# Patient Record
Sex: FEMALE | Race: WHITE | NOT HISPANIC OR LATINO | Employment: OTHER | URBAN - NONMETROPOLITAN AREA
[De-identification: names, ages, dates, MRNs, and addresses within clinical notes are randomized per-mention and may not be internally consistent; named-entity substitution may affect disease eponyms.]

---

## 2017-01-17 RX ORDER — CITALOPRAM 20 MG/1
TABLET ORAL
Qty: 28 TABLET | OUTPATIENT
Start: 2017-01-17

## 2017-01-17 NOTE — TELEPHONE ENCOUNTER
Patient states she is now seeing Dr. Harpreet Mcgarry,  [facility] needs to contact the patient for more information on the provider.

## 2017-10-19 ENCOUNTER — OFFICE VISIT (OUTPATIENT)
Dept: PODIATRY | Facility: CLINIC | Age: 72
End: 2017-10-19

## 2017-10-19 VITALS
WEIGHT: 208 LBS | BODY MASS INDEX: 29.12 KG/M2 | SYSTOLIC BLOOD PRESSURE: 105 MMHG | OXYGEN SATURATION: 95 % | HEART RATE: 78 BPM | DIASTOLIC BLOOD PRESSURE: 69 MMHG | HEIGHT: 71 IN

## 2017-10-19 DIAGNOSIS — B07.0 PLANTAR WARTS: Primary | ICD-10-CM

## 2017-10-19 DIAGNOSIS — M79.672 LEFT FOOT PAIN: ICD-10-CM

## 2017-10-19 PROCEDURE — 99203 OFFICE O/P NEW LOW 30 MIN: CPT | Performed by: PODIATRIST

## 2017-10-19 PROCEDURE — 11100 PR BIOPSY OF SKIN LESION: CPT | Performed by: PODIATRIST

## 2017-10-19 RX ORDER — PANTOPRAZOLE SODIUM 40 MG/1
40 TABLET, DELAYED RELEASE ORAL DAILY
COMMUNITY

## 2017-10-19 RX ORDER — CARVEDILOL 12.5 MG/1
12.5 TABLET ORAL 2 TIMES DAILY WITH MEALS
COMMUNITY

## 2017-10-19 RX ORDER — CELECOXIB 200 MG/1
200 CAPSULE ORAL DAILY
COMMUNITY

## 2017-10-19 RX ORDER — OXYCODONE AND ACETAMINOPHEN 10; 325 MG/1; MG/1
1 TABLET ORAL EVERY 6 HOURS PRN
COMMUNITY

## 2017-11-02 ENCOUNTER — OFFICE VISIT (OUTPATIENT)
Dept: PODIATRY | Facility: CLINIC | Age: 72
End: 2017-11-02

## 2017-11-02 VITALS — BODY MASS INDEX: 29.12 KG/M2 | WEIGHT: 208 LBS | HEIGHT: 71 IN

## 2017-11-02 DIAGNOSIS — M79.672 LEFT FOOT PAIN: ICD-10-CM

## 2017-11-02 DIAGNOSIS — B07.0 PLANTAR WARTS: Primary | ICD-10-CM

## 2017-11-02 PROCEDURE — 11100 PR BIOPSY OF SKIN LESION: CPT | Performed by: PODIATRIST

## 2017-11-02 NOTE — PROGRESS NOTES
Chanell Carrera  1945  72 y.o. female   Patient presents today for left foot biopsy results and suture removal.      Chief Complaint   Patient presents with   • Left Foot - Follow-up           History of Present Illness    Chanell Carrera is a 72 y.o. female            Past Medical History:   Diagnosis Date   • Plantar wart          Past Surgical History:   Procedure Laterality Date   • BACK SURGERY     •  SECTION     • HYSTERECTOMY     • NECK SURGERY     • VEIN SURGERY      removed between ovary and kidney         Family History   Problem Relation Age of Onset   • Cancer Mother    • Cancer Father    • Cancer Brother    • Cancer Maternal Grandmother    • Cancer Maternal Grandfather    • Cancer Paternal Grandmother    • Cancer Paternal Grandfather    • Cancer Sister          Social History     Social History   • Marital status:      Spouse name: N/A   • Number of children: N/A   • Years of education: N/A     Occupational History   • Not on file.     Social History Main Topics   • Smoking status: Never Smoker   • Smokeless tobacco: Never Used   • Alcohol use No   • Drug use: No   • Sexual activity: Not on file     Other Topics Concern   • Not on file     Social History Narrative         Current Outpatient Prescriptions   Medication Sig Dispense Refill   • carvedilol (COREG) 12.5 MG tablet Take 12.5 mg by mouth 2 (Two) Times a Day With Meals.     • celecoxib (CeleBREX) 200 MG capsule Take 200 mg by mouth Daily.     • citalopram (CeleXA) 20 MG tablet Take 1 tablet by mouth daily. 90 tablet 1   • Mirabegron ER (MYRBETRIQ) 50 MG tablet sustained-release 24 hour 24 hr tablet Take 50 mg by mouth Daily.     • oxyCODONE-acetaminophen (PERCOCET)  MG per tablet Take 1 tablet by mouth Every 6 (Six) Hours As Needed for Moderate Pain .     • pantoprazole (PROTONIX) 40 MG EC tablet Take 40 mg by mouth Daily.       No current facility-administered medications for this visit.   "        OBJECTIVE    Ht 71\" (180.3 cm)  Wt 208 lb (94.3 kg)  BMI 29.01 kg/m2      Review of Systems   ***      ***            ASSESSMENT AND PLAN    There are no diagnoses linked to this encounter.          This document has been electronically signed by Estephania Talbot MA on November 2, 2017 4:06 PM     EMR Dragon/Transcription disclaimer:   Much of this encounter note is an electronic transcription/translation of spoken language to printed text. The electronic translation of spoken language may permit erroneous, or at times, nonsensical words or phrases to be inadvertently transcribed; Although I have reviewed the note for such errors, some may still exist.    Estephania Talbot MA  11/2/2017  4:06 PM            "

## 2017-11-16 NOTE — PROGRESS NOTES
Chanell Carrera  1945  72 y.o. female   Patient presents today for plantar wart of the left foot.   2017    Chief Complaint   Patient presents with   • Left Foot - Follow-up           History of Present Illness    Chanell Carrera is a 72 y.o. female who presents For follow-up of left foot skin lesion biopsy results.      Past Medical History:   Diagnosis Date   • Plantar wart          Past Surgical History:   Procedure Laterality Date   • BACK SURGERY     •  SECTION     • HYSTERECTOMY     • NECK SURGERY     • VEIN SURGERY      removed between ovary and kidney         Family History   Problem Relation Age of Onset   • Cancer Mother    • Cancer Father    • Cancer Brother    • Cancer Maternal Grandmother    • Cancer Maternal Grandfather    • Cancer Paternal Grandmother    • Cancer Paternal Grandfather    • Cancer Sister          Social History     Social History   • Marital status:      Spouse name: N/A   • Number of children: N/A   • Years of education: N/A     Occupational History   • Not on file.     Social History Main Topics   • Smoking status: Never Smoker   • Smokeless tobacco: Never Used   • Alcohol use No   • Drug use: No   • Sexual activity: Not on file     Other Topics Concern   • Not on file     Social History Narrative         Current Outpatient Prescriptions   Medication Sig Dispense Refill   • carvedilol (COREG) 12.5 MG tablet Take 12.5 mg by mouth 2 (Two) Times a Day With Meals.     • celecoxib (CeleBREX) 200 MG capsule Take 200 mg by mouth Daily.     • citalopram (CeleXA) 20 MG tablet Take 1 tablet by mouth daily. 90 tablet 1   • Mirabegron ER (MYRBETRIQ) 50 MG tablet sustained-release 24 hour 24 hr tablet Take 50 mg by mouth Daily.     • oxyCODONE-acetaminophen (PERCOCET)  MG per tablet Take 1 tablet by mouth Every 6 (Six) Hours As Needed for Moderate Pain .     • pantoprazole (PROTONIX) 40 MG EC tablet Take 40 mg by mouth Daily.       No current  "facility-administered medications for this visit.          OBJECTIVE    Ht 71\" (180.3 cm)  Wt 208 lb (94.3 kg)  BMI 29.01 kg/m2      Review of Systems   Constitutional:  Denies recent weight loss, weight gain, fever or chills, no change in exercise tolerance  Musculoskeletal: Toe/foot pain.   Skin: Plantar warts  Neurological: Denies paresthesias.  Psychiatric/Behavioral: Denies depression        Physical Exam   Constitutional: she appears well-developed and well-nourished.   CV: No chest pain. Normal RR  Resp: Non labored respirations  Psychiatric: she has a normal mood and affect. her behavior is normal.      Lower Extremity Exam:  Vascular: DP/PT pulses palpable 2+.   No edema  Toes warm  Neuro: Protective sensation intact, b/l.  DTRs intact  Integument: No open wounds.  No erythema, scaling  8-10  plantar verruca to left foot and forefoot.  Largest measuring approximately 2 cm in diameter.  +pain on lateral squeeze test.  Musculoskeletal: LE muscle strength 5/5.   Gait normal  Ankle ROM full without pain or crepitus  STJ ROM full without pain or crepitus  No digital deformities      Left skin biopsy, autoinnoculation:  Risks, benefits discussed. Written consent obtained.  Local area anesthetized with 2 % lidocaine plain.  Site cleansed with ChloraPrep swab  Two 4-mm punch biopsies taken.  The biopsies were then inverted and reinserted into the biopsy site to serve as auto inoculation process.  Biopsy site reapproximated with 4-0 nylon  Her strips and dry dressing applied  Pt tolerated well          ASSESSMENT AND PLAN    Chanell was seen today for follow-up.    Diagnoses and all orders for this visit:    Plantar warts    Left foot pain      -Comprehensive foot and ankle exam performed  -Educated pt on diagnosis, etiology and treatment of plantar verruca.  -Patient has previously failed standard therapies for plantar warts.  Discussed treatment options including auto inoculation procedures.  Patient would like to " proceed and the procedure was performed as above  -Recheck 2 weeks.          This document has been electronically signed by Alvin Back DPM on November 15, 2017 8:27 PM     EMR Dragon/Transcription disclaimer:   Much of this encounter note is an electronic transcription/translation of spoken language to printed text. The electronic translation of spoken language may permit erroneous, or at times, nonsensical words or phrases to be inadvertently transcribed; Although I have reviewed the note for such errors, some may still exist.    Alvin Back DPM  11/15/2017  8:27 PM

## 2017-11-21 ENCOUNTER — OFFICE VISIT (OUTPATIENT)
Dept: PODIATRY | Facility: CLINIC | Age: 72
End: 2017-11-21

## 2017-11-21 VITALS — HEIGHT: 71 IN | WEIGHT: 208 LBS | BODY MASS INDEX: 29.12 KG/M2

## 2017-11-21 DIAGNOSIS — B07.0 PLANTAR WARTS: Primary | ICD-10-CM

## 2017-11-21 DIAGNOSIS — M79.672 LEFT FOOT PAIN: ICD-10-CM

## 2017-11-21 PROCEDURE — 99212 OFFICE O/P EST SF 10 MIN: CPT | Performed by: PODIATRIST

## 2017-12-12 ENCOUNTER — OFFICE VISIT (OUTPATIENT)
Dept: PODIATRY | Facility: CLINIC | Age: 72
End: 2017-12-12

## 2017-12-12 VITALS — HEIGHT: 71 IN | BODY MASS INDEX: 29.12 KG/M2 | WEIGHT: 208 LBS

## 2017-12-12 DIAGNOSIS — B07.0 PLANTAR WARTS: Primary | ICD-10-CM

## 2017-12-12 DIAGNOSIS — M79.672 LEFT FOOT PAIN: ICD-10-CM

## 2017-12-12 PROCEDURE — 99212 OFFICE O/P EST SF 10 MIN: CPT | Performed by: PODIATRIST

## 2017-12-12 NOTE — PROGRESS NOTES
Chanell Carrera  1945  72 y.o. female   Patient presents today for plantar wart of the left foot follow-up.   2017    Chief Complaint   Patient presents with   • Left Foot - Follow-up           History of Present Illness    Chanell Carrera is a 72 y.o. female who presents For follow-up of left foot plantar verruca autoinoculation procedure. Doing well.      Past Medical History:   Diagnosis Date   • Plantar wart          Past Surgical History:   Procedure Laterality Date   • BACK SURGERY     •  SECTION     • HYSTERECTOMY     • NECK SURGERY     • VEIN SURGERY      removed between ovary and kidney         Family History   Problem Relation Age of Onset   • Cancer Mother    • Cancer Father    • Cancer Brother    • Cancer Maternal Grandmother    • Cancer Maternal Grandfather    • Cancer Paternal Grandmother    • Cancer Paternal Grandfather    • Cancer Sister          Social History     Social History   • Marital status:      Spouse name: N/A   • Number of children: N/A   • Years of education: N/A     Occupational History   • Not on file.     Social History Main Topics   • Smoking status: Never Smoker   • Smokeless tobacco: Never Used   • Alcohol use No   • Drug use: No   • Sexual activity: Not on file     Other Topics Concern   • Not on file     Social History Narrative         Current Outpatient Prescriptions   Medication Sig Dispense Refill   • carvedilol (COREG) 12.5 MG tablet Take 12.5 mg by mouth 2 (Two) Times a Day With Meals.     • celecoxib (CeleBREX) 200 MG capsule Take 200 mg by mouth Daily.     • citalopram (CeleXA) 20 MG tablet Take 1 tablet by mouth daily. 90 tablet 1   • Mirabegron ER (MYRBETRIQ) 50 MG tablet sustained-release 24 hour 24 hr tablet Take 50 mg by mouth Daily.     • oxyCODONE-acetaminophen (PERCOCET)  MG per tablet Take 1 tablet by mouth Every 6 (Six) Hours As Needed for Moderate Pain .     • pantoprazole (PROTONIX) 40 MG EC tablet Take 40 mg by  "mouth Daily.       No current facility-administered medications for this visit.          OBJECTIVE    Ht 180.3 cm (71\")  Wt 94.3 kg (208 lb)  BMI 29.01 kg/m2      Review of Systems   Constitutional:  Denies recent weight loss, weight gain, fever or chills, no change in exercise tolerance  Musculoskeletal: Toe/foot pain.   Skin: Plantar warts  Neurological: Denies paresthesias.  Psychiatric/Behavioral: Denies depression        Physical Exam   Constitutional: she appears well-developed and well-nourished.   CV: No chest pain. Normal RR  Resp: Non labored respirations  Psychiatric: she has a normal mood and affect. her behavior is normal.      Lower Extremity Exam:  Vascular: DP/PT pulses palpable 2+.   No edema  Toes warm  Neuro: Protective sensation intact, b/l.  DTRs intact  Integument: No open wounds.  No erythema, scaling  8-10  plantar verruca to left foot and forefoot.  Largest measuring approximately 2 cm in diameter.  +pain on lateral squeeze test.  Biopsy/autoinoculation sites well healed  Musculoskeletal: LE muscle strength 5/5.   Gait normal  Ankle ROM full without pain or crepitus  STJ ROM full without pain or crepitus  No digital deformities              ASSESSMENT AND PLAN    Chanell was seen today for follow-up.    Diagnoses and all orders for this visit:    Plantar warts    Left foot pain    -Comprehensive foot and ankle exam performed  -Doing well.  Plantar verruca showing modest signs of improvement.  Educated patient that we do not expect dramatic improvement inside of 6 weeks.  -Recheck 3 weeks.          This document has been electronically signed by Alvin Back DPM on December 17, 2017 4:34 PM     EMR Dragon/Transcription disclaimer:   Much of this encounter note is an electronic transcription/translation of spoken language to printed text. The electronic translation of spoken language may permit erroneous, or at times, nonsensical words or phrases to be inadvertently transcribed; Although " I have reviewed the note for such errors, some may still exist.    Alvin Back DPM  12/17/2017  4:34 PM

## 2018-05-07 ENCOUNTER — OFFICE VISIT (OUTPATIENT)
Dept: PODIATRY | Facility: CLINIC | Age: 73
End: 2018-05-07

## 2018-05-07 VITALS — HEIGHT: 71 IN | BODY MASS INDEX: 30.27 KG/M2 | WEIGHT: 216.2 LBS

## 2018-05-07 DIAGNOSIS — M79.672 LEFT FOOT PAIN: ICD-10-CM

## 2018-05-07 DIAGNOSIS — B07.0 PLANTAR WARTS: Primary | ICD-10-CM

## 2018-05-07 PROCEDURE — 99214 OFFICE O/P EST MOD 30 MIN: CPT | Performed by: PODIATRIST

## 2018-05-07 NOTE — PROGRESS NOTES
Chanell Carrera  1945  73 y.o. female   Patient presents today for plantar wart of the left foot follow-up.   2018      Chief Complaint   Patient presents with   • Left Foot - Pain           History of Present Illness    Chanell Carrera is a 73 y.o. female who presents For follow-up of left foot plantar verruca. Previously underwent autoinoculation procedure without significant improvement.       Past Medical History:   Diagnosis Date   • Anxiety    • Arthritis    • C. difficile diarrhea     approx 5-6 yr ago   • Chronic back pain    • GERD (gastroesophageal reflux disease)    • Kidney atrophy     r/t pregnency    • Plantar wart    • Seizure     x 1, r/t medication   • Sleep apnea     does not wear c-pap         Past Surgical History:   Procedure Laterality Date   • BACK SURGERY      x 6   •  SECTION     • HYSTERECTOMY     • NECK SURGERY     • VEIN SURGERY      removed between ovary and kidney         Family History   Problem Relation Age of Onset   • Cancer Mother    • Cancer Father    • Cancer Brother    • Cancer Maternal Grandmother    • Cancer Maternal Grandfather    • Cancer Paternal Grandmother    • Cancer Paternal Grandfather    • Cancer Sister          Social History     Social History   • Marital status:      Spouse name: N/A   • Number of children: N/A   • Years of education: N/A     Occupational History   • Not on file.     Social History Main Topics   • Smoking status: Never Smoker   • Smokeless tobacco: Never Used   • Alcohol use Yes      Comment: occasional   • Drug use: No   • Sexual activity: Defer     Other Topics Concern   • Not on file     Social History Narrative   • No narrative on file         No current facility-administered medications for this visit.      No current outpatient prescriptions on file.     Facility-Administered Medications Ordered in Other Visits   Medication Dose Route Frequency Provider Last Rate Last Dose   • ceFAZolin (ANCEF) 2 g in  "sodium chloride 0.9 % 100 mL IVPB  2 g Intravenous Once Alvin Back DPM       • electrolyte-148 (PLASMALYTE) solution 1,000 mL  1,000 mL Intravenous Continuous Emil Smart MD 25 mL/hr at 05/16/18 0621 1,000 mL at 05/16/18 0621         OBJECTIVE    Ht 180.3 cm (71\")   Wt 98.1 kg (216 lb 3.2 oz)   BMI 30.15 kg/m²       Review of Systems   Constitutional:  Denies recent weight loss, weight gain, fever or chills, no change in exercise tolerance  Musculoskeletal: Toe/foot pain.   Skin: Plantar warts  Neurological: Denies paresthesias.  Psychiatric/Behavioral: Denies depression        Physical Exam   Constitutional: she appears well-developed and well-nourished.   CV: No chest pain. Normal RR  Resp: Non labored respirations  Psychiatric: she has a normal mood and affect. her behavior is normal.      Lower Extremity Exam:  Vascular: DP/PT pulses palpable 2+.   No edema  Toes warm  Neuro: Protective sensation intact, b/l.  DTRs intact  Integument: No open wounds.  No erythema, scaling  8-10  plantar verruca to left foot and forefoot.  Largest measuring approximately 2 cm in diameter.  +pain on lateral squeeze test.  Biopsy/autoinoculation sites well healed  Musculoskeletal: LE muscle strength 5/5.   Gait normal  Ankle ROM full without pain or crepitus  STJ ROM full without pain or crepitus  No digital deformities              ASSESSMENT AND PLAN    Chanell was seen today for pain.    Diagnoses and all orders for this visit:    Plantar warts  -     Case Request    Left foot pain      -Comprehensive foot and ankle exam performed  -Minimal improvement since last visit. At this point I did recommend formal excision and curettage in OR. Patient is agreeable. All risks, benefits and potential complications including but not limited to delayed healing, recurrence, risk of infection  Discussed.  -Will plan for 5/16/18          This document has been electronically signed by Alvin Back DPM on May 16, 2018 6:58 AM "     SEAN Dragon/Transcription disclaimer:   Much of this encounter note is an electronic transcription/translation of spoken language to printed text. The electronic translation of spoken language may permit erroneous, or at times, nonsensical words or phrases to be inadvertently transcribed; Although I have reviewed the note for such errors, some may still exist.    Alvin Back DPM  5/16/2018  6:58 AM

## 2018-05-11 ENCOUNTER — APPOINTMENT (OUTPATIENT)
Dept: PREADMISSION TESTING | Facility: HOSPITAL | Age: 73
End: 2018-05-11

## 2018-05-11 VITALS
SYSTOLIC BLOOD PRESSURE: 102 MMHG | RESPIRATION RATE: 14 BRPM | DIASTOLIC BLOOD PRESSURE: 60 MMHG | BODY MASS INDEX: 28.71 KG/M2 | HEIGHT: 72 IN | HEART RATE: 88 BPM | OXYGEN SATURATION: 95 % | WEIGHT: 212 LBS

## 2018-05-11 PROCEDURE — 93010 ELECTROCARDIOGRAM REPORT: CPT | Performed by: INTERNAL MEDICINE

## 2018-05-11 PROCEDURE — 93005 ELECTROCARDIOGRAM TRACING: CPT

## 2018-05-11 RX ORDER — SODIUM CHLORIDE, SODIUM GLUCONATE, SODIUM ACETATE, POTASSIUM CHLORIDE, AND MAGNESIUM CHLORIDE 526; 502; 368; 37; 30 MG/100ML; MG/100ML; MG/100ML; MG/100ML; MG/100ML
1000 INJECTION, SOLUTION INTRAVENOUS CONTINUOUS
Status: CANCELLED | OUTPATIENT
Start: 2018-05-16

## 2018-05-11 RX ORDER — METHOCARBAMOL 500 MG/1
500 TABLET, FILM COATED ORAL 2 TIMES DAILY
COMMUNITY
End: 2018-06-22 | Stop reason: SDUPTHER

## 2018-05-11 RX ORDER — NITROFURANTOIN 25; 75 MG/1; MG/1
100 CAPSULE ORAL EVERY 12 HOURS SCHEDULED
Status: ON HOLD | COMMUNITY
End: 2018-05-16

## 2018-05-11 RX ORDER — DIPHENHYDRAMINE HCL 25 MG
25 CAPSULE ORAL EVERY 6 HOURS PRN
COMMUNITY

## 2018-05-11 RX ORDER — ZOLPIDEM TARTRATE 10 MG/1
10 TABLET ORAL NIGHTLY PRN
COMMUNITY

## 2018-05-11 RX ORDER — TRAZODONE HYDROCHLORIDE 100 MG/1
100 TABLET ORAL NIGHTLY
COMMUNITY

## 2018-05-11 NOTE — DISCHARGE INSTRUCTIONS
[facility]  Pre-op Information and Guidelines    You will be called after 2 p.m. the day before your surgery (Friday for Monday surgery) and notified of your time for arrival and approximate surgery time.  If you have not received a call by 4P.M., please contact Same Day Surgery at [phone] of if outside [facility] call [phone].    Please Follow these Important Safety Guidelines:    • The morning of your procedure, take only the medications listed below with   A sip of water:___CARVEDILOL, CITALOPRAM, PANTOPRAZOLE,________       ___OXYCODONE_____________________________________    • DO NOT eat or drink anything after 12:00 midnight the night before surgery  Specific instructions concerning drinking clear liquids will be discussed during  the pre-surgery instruction call the day before your surgery.    • If you take a blood thinner (ex. Plavix, Coumadin, aspirin), ask your doctor when to stop it before surgery  STOP DATE: _________________    • Only 2 visitors are allowed in patient rooms at a time  Your visitors will be asked to wait in the lobby until the admission process is complete with the exception of a parent with a child and patients in need of special assistance.    • YOU CANNOT DRIVE YOURSELF HOME  You must be accompanied by someone who will be responsible for driving you home after surgery and for your care at home.    • DO NOT chew gum, use breath mints, hard candy, or smoke the day of surgery  • DO NOT drink alcohol for at least 24 hours before your surgery  • DO NOT wear any jewelry and remove all body piercing before coming to the hospital  • DO NOT wear make-up to the hospital  • If you are having surgery on an extremity (arm/leg/foot) remove nail polish/artificial nails on the surgical side  • Clothing, glasses, contacts, dentures, and hairpieces must be removed before surgery  • Bathe the night before or the morning of your surgery and do not use powders/lotions  on skin.

## 2018-05-16 ENCOUNTER — HOSPITAL ENCOUNTER (OUTPATIENT)
Facility: HOSPITAL | Age: 73
Setting detail: HOSPITAL OUTPATIENT SURGERY
Discharge: HOME | End: 2018-05-16
Attending: PODIATRIST | Admitting: PODIATRIST

## 2018-05-16 ENCOUNTER — ANESTHESIA (OUTPATIENT)
Dept: PERIOP | Facility: HOSPITAL | Age: 73
End: 2018-05-16

## 2018-05-16 ENCOUNTER — ANESTHESIA EVENT (OUTPATIENT)
Dept: PERIOP | Facility: HOSPITAL | Age: 73
End: 2018-05-16

## 2018-05-16 VITALS
WEIGHT: 206.35 LBS | HEART RATE: 80 BPM | DIASTOLIC BLOOD PRESSURE: 86 MMHG | RESPIRATION RATE: 20 BRPM | HEIGHT: 72 IN | BODY MASS INDEX: 27.95 KG/M2 | TEMPERATURE: 96.4 F | OXYGEN SATURATION: 93 % | SYSTOLIC BLOOD PRESSURE: 171 MMHG

## 2018-05-16 DIAGNOSIS — B07.0 PLANTAR WARTS: ICD-10-CM

## 2018-05-16 PROCEDURE — 25010000002 MIDAZOLAM PER 1 MG: Performed by: NURSE ANESTHETIST, CERTIFIED REGISTERED

## 2018-05-16 PROCEDURE — 25010000002 FENTANYL CITRATE (PF) 100 MCG/2ML SOLUTION: Performed by: NURSE ANESTHETIST, CERTIFIED REGISTERED

## 2018-05-16 PROCEDURE — 88305 TISSUE EXAM BY PATHOLOGIST: CPT | Performed by: PATHOLOGY

## 2018-05-16 PROCEDURE — 17111 DESTRUCTION B9 LESIONS 15/>: CPT | Performed by: PODIATRIST

## 2018-05-16 PROCEDURE — 25010000002 PROPOFOL 10 MG/ML EMULSION: Performed by: NURSE ANESTHETIST, CERTIFIED REGISTERED

## 2018-05-16 PROCEDURE — 25010000002 DEXAMETHASONE PER 1 MG: Performed by: NURSE ANESTHETIST, CERTIFIED REGISTERED

## 2018-05-16 PROCEDURE — 88305 TISSUE EXAM BY PATHOLOGIST: CPT | Performed by: PODIATRIST

## 2018-05-16 PROCEDURE — 25010000003 CEFAZOLIN PER 500 MG: Performed by: PODIATRIST

## 2018-05-16 PROCEDURE — 25010000002 ONDANSETRON PER 1 MG: Performed by: NURSE ANESTHETIST, CERTIFIED REGISTERED

## 2018-05-16 RX ORDER — FENTANYL CITRATE 50 UG/ML
INJECTION, SOLUTION INTRAMUSCULAR; INTRAVENOUS AS NEEDED
Status: DISCONTINUED | OUTPATIENT
Start: 2018-05-16 | End: 2018-05-16 | Stop reason: SURG

## 2018-05-16 RX ORDER — BUPIVACAINE HYDROCHLORIDE AND EPINEPHRINE 5; 5 MG/ML; UG/ML
INJECTION, SOLUTION EPIDURAL; INTRACAUDAL; PERINEURAL AS NEEDED
Status: DISCONTINUED | OUTPATIENT
Start: 2018-05-16 | End: 2018-05-16 | Stop reason: HOSPADM

## 2018-05-16 RX ORDER — FLUMAZENIL 0.1 MG/ML
0.2 INJECTION INTRAVENOUS AS NEEDED
Status: DISCONTINUED | OUTPATIENT
Start: 2018-05-16 | End: 2018-05-16 | Stop reason: HOSPADM

## 2018-05-16 RX ORDER — MEPERIDINE HYDROCHLORIDE 50 MG/ML
12.5 INJECTION INTRAMUSCULAR; INTRAVENOUS; SUBCUTANEOUS
Status: DISCONTINUED | OUTPATIENT
Start: 2018-05-16 | End: 2018-05-16 | Stop reason: HOSPADM

## 2018-05-16 RX ORDER — ONDANSETRON 2 MG/ML
INJECTION INTRAMUSCULAR; INTRAVENOUS AS NEEDED
Status: DISCONTINUED | OUTPATIENT
Start: 2018-05-16 | End: 2018-05-16 | Stop reason: SURG

## 2018-05-16 RX ORDER — EPHEDRINE SULFATE 50 MG/ML
5 INJECTION, SOLUTION INTRAVENOUS ONCE AS NEEDED
Status: DISCONTINUED | OUTPATIENT
Start: 2018-05-16 | End: 2018-05-16 | Stop reason: HOSPADM

## 2018-05-16 RX ORDER — SODIUM CHLORIDE, SODIUM GLUCONATE, SODIUM ACETATE, POTASSIUM CHLORIDE, AND MAGNESIUM CHLORIDE 526; 502; 368; 37; 30 MG/100ML; MG/100ML; MG/100ML; MG/100ML; MG/100ML
1000 INJECTION, SOLUTION INTRAVENOUS CONTINUOUS
Status: DISCONTINUED | OUTPATIENT
Start: 2018-05-16 | End: 2018-05-16 | Stop reason: HOSPADM

## 2018-05-16 RX ORDER — PROPOFOL 10 MG/ML
VIAL (ML) INTRAVENOUS AS NEEDED
Status: DISCONTINUED | OUTPATIENT
Start: 2018-05-16 | End: 2018-05-16 | Stop reason: SURG

## 2018-05-16 RX ORDER — DEXAMETHASONE SODIUM PHOSPHATE 4 MG/ML
INJECTION, SOLUTION INTRA-ARTICULAR; INTRALESIONAL; INTRAMUSCULAR; INTRAVENOUS; SOFT TISSUE AS NEEDED
Status: DISCONTINUED | OUTPATIENT
Start: 2018-05-16 | End: 2018-05-16 | Stop reason: SURG

## 2018-05-16 RX ORDER — NALOXONE HCL 0.4 MG/ML
0.2 VIAL (ML) INJECTION AS NEEDED
Status: DISCONTINUED | OUTPATIENT
Start: 2018-05-16 | End: 2018-05-16 | Stop reason: HOSPADM

## 2018-05-16 RX ORDER — LABETALOL HYDROCHLORIDE 5 MG/ML
5 INJECTION, SOLUTION INTRAVENOUS
Status: DISCONTINUED | OUTPATIENT
Start: 2018-05-16 | End: 2018-05-16 | Stop reason: HOSPADM

## 2018-05-16 RX ORDER — ACETAMINOPHEN 325 MG/1
650 TABLET ORAL ONCE AS NEEDED
Status: DISCONTINUED | OUTPATIENT
Start: 2018-05-16 | End: 2018-05-16 | Stop reason: HOSPADM

## 2018-05-16 RX ORDER — ACETAMINOPHEN 650 MG/1
650 SUPPOSITORY RECTAL ONCE AS NEEDED
Status: DISCONTINUED | OUTPATIENT
Start: 2018-05-16 | End: 2018-05-16 | Stop reason: HOSPADM

## 2018-05-16 RX ORDER — MIDAZOLAM HYDROCHLORIDE 1 MG/ML
INJECTION INTRAMUSCULAR; INTRAVENOUS AS NEEDED
Status: DISCONTINUED | OUTPATIENT
Start: 2018-05-16 | End: 2018-05-16 | Stop reason: SURG

## 2018-05-16 RX ORDER — LIDOCAINE HYDROCHLORIDE 20 MG/ML
INJECTION, SOLUTION INFILTRATION; PERINEURAL AS NEEDED
Status: DISCONTINUED | OUTPATIENT
Start: 2018-05-16 | End: 2018-05-16 | Stop reason: SURG

## 2018-05-16 RX ORDER — DIPHENHYDRAMINE HYDROCHLORIDE 50 MG/ML
12.5 INJECTION INTRAMUSCULAR; INTRAVENOUS
Status: DISCONTINUED | OUTPATIENT
Start: 2018-05-16 | End: 2018-05-16 | Stop reason: HOSPADM

## 2018-05-16 RX ORDER — ONDANSETRON 2 MG/ML
4 INJECTION INTRAMUSCULAR; INTRAVENOUS ONCE AS NEEDED
Status: DISCONTINUED | OUTPATIENT
Start: 2018-05-16 | End: 2018-05-16 | Stop reason: HOSPADM

## 2018-05-16 RX ADMIN — PROPOFOL 100 MG: 10 INJECTION, EMULSION INTRAVENOUS at 07:26

## 2018-05-16 RX ADMIN — SODIUM CHLORIDE, SODIUM GLUCONATE, SODIUM ACETATE, POTASSIUM CHLORIDE, AND MAGNESIUM CHLORIDE 1000 ML: 526; 502; 368; 37; 30 INJECTION, SOLUTION INTRAVENOUS at 06:21

## 2018-05-16 RX ADMIN — MIDAZOLAM 1 MG: 1 INJECTION INTRAMUSCULAR; INTRAVENOUS at 07:20

## 2018-05-16 RX ADMIN — CEFAZOLIN SODIUM 2 G: 1 INJECTION, POWDER, FOR SOLUTION INTRAMUSCULAR; INTRAVENOUS at 07:27

## 2018-05-16 RX ADMIN — DEXAMETHASONE SODIUM PHOSPHATE 4 MG: 4 INJECTION, SOLUTION INTRAMUSCULAR; INTRAVENOUS at 07:30

## 2018-05-16 RX ADMIN — ONDANSETRON 4 MG: 2 INJECTION INTRAMUSCULAR; INTRAVENOUS at 08:12

## 2018-05-16 RX ADMIN — FENTANYL CITRATE 50 MCG: 50 INJECTION, SOLUTION INTRAMUSCULAR; INTRAVENOUS at 07:20

## 2018-05-16 RX ADMIN — LIDOCAINE HYDROCHLORIDE 80 MG: 20 INJECTION, SOLUTION INFILTRATION; PERINEURAL at 07:26

## 2018-05-16 NOTE — ANESTHESIA PROCEDURE NOTES
Airway  Urgency: elective    Airway not difficult    General Information and Staff    Patient location during procedure: OR  CRNA: VARINDER CERVANTES    Indications and Patient Condition    Preoxygenated: yes  Mask difficulty assessment: 0 - not attempted    Final Airway Details  Final airway type: supraglottic airway      Successful airway: I-gel  Size 4    Number of attempts at approach: 1    Additional Comments  Dr. Moshe Burton placed LMA under direct supervision of CRNA; no comps noted, patient tolerated well, sats maintained > 95% throughout induction.

## 2018-05-16 NOTE — INTERVAL H&P NOTE
H&P reviewed. The patient was examined and there are no changes to the H&P.   Proceed as planned.          This document has been electronically signed by Alvin Back DPM on May 16, 2018 7:01 AM

## 2018-05-16 NOTE — OP NOTE
DATE OF PROCEDURE:  05/16/2018      PREOPERATIVE DIAGNOSES:  1. Plantar verruca, left foot      POSTOPERATIVE DIAGNOSES:  1. Plantar verruca, left foot      PROCEDURES:  1. Ablation and curettage of plantar verruca, left foot      ASSISTANT: Marely Caputo CST      ANESTHESIA: LMA General      HEMOSTASIS: None      ESTIMATED BLOOD LOSS: Less than 10 mL.      MATERIALS: None      INJECTABLES: 30 mL .0.5% Marcaine with epinephrine       COMPLICATIONS: None.      INDICATION: This is a prior patient seen in my clinic for treatment of plantar verruca. she failed prior conservative therapies.  The patient elected for the proposed surgical plan. All risks, benefits, potential complications were described in detail. No guarantees given or implied at anytime. He has been n.p.o. Since midnight. Informed consent has been obtained and located in the chart. Cephazolin 2 g was given as preoperative antibiotics in the preoperative holding area.      DESCRIPTION:    Under mild sedation patient brought in the operating room placed on operative table in supine position.  Following induction of general anesthesia the left foot and ankle were prepped and draped in usual aseptic fashion.  Attention was then drawn to the plantar left foot where there were approximately 17 discrete plantar verruca noted.  The largest measuring approximately 2.5 cm in diameter plantar lateral midfoot.  Each of these plantar verruca were anesthetized with the above-mentioned local anesthetic sublesionally.  An additional 8 cc of local anesthetic was utilized in a posterior tibial nerve block.  Each of the lesions were then sequentially ablated utilizing electrocautery, curettaged with a small curette and debrided with a 15 blade.  This process was repeated as needed until the lesions were ablated down to the level of the basement membrane.  Final electrocautery was utilized for hemostasis and the areas of excision were flushed with sterile saline.  A  dressing including Silvadene and dry gauze was then applied.  Patient will be able to ambulate in a surgical shoe and she'll follow up early next week for wound check.          This document has been electronically signed by Alvin Back DPM on May 16, 2018 8:41 AM   EMR Dragon/Transcription disclaimer:   Much of this encounter note is an electronic transcription/translation of spoken language to printed text. The electronic translation of spoken language may permit erroneous, or at times, nonsensical words or phrases to be inadvertently transcribed; Although I have reviewed the note for such errors, some may still exist.

## 2018-05-16 NOTE — BRIEF OP NOTE
EXCISION FOOT TUMOR  Progress Note    Chanell Rubi Adinolfi  5/16/2018    Pre-op Diagnosis:   Plantar warts [B07.0]       Post-Op Diagnosis Codes:     * Plantar warts [B07.0]    Procedure/CPT® Codes:      Procedure(s):  FOOT EXCISION AND ABLATION PLANTAR WARTS    Surgeon(s):  Alvin Back DPM    Anesthesia: Choice    Staff:   Circulator: Raman Mejia RN  Scrub Person: Roula Fontana  Assistant: Marely Caputo MA    Estimated Blood Loss: 20 mL    Urine Voided: * No values recorded between 5/16/2018  7:19 AM and 5/16/2018  8:32 AM *    Specimens:                ID Type Source Tests Collected by Time   A : plantar warts Tissue Foot, Left TISSUE PATHOLOGY EXAM Alvin Back DPM 5/16/2018 0754         Drains:      Findings: Consistent with diagnosis.    Complications: None      Alvin Back DPM     Date: 5/16/2018  Time: 8:33 AM

## 2018-05-16 NOTE — DISCHARGE INSTRUCTIONS
Care after Local Anesthesia    You have remained awake during your surgery. The medicine you received was injected directly into the surgery site. The medicine numbed the area so you didn't feel any pain during surgery. Depending on the medicine used, you may feel comfortable for several hours.    Activity:    1) Use caution to protect your extremity from injury until the numbness is gone. You may return to your normal home activities as directed by your health care provider.    2) You are at an increased risk for falling related to the anesthesia and/or sedation during surgery and pain medication you will take at home. You will need assistance with ambulation until the feelings in your extremity returns to normal. Utilize assist devices (example: crutches, walker, cane or a care provider/family member as needed).    3) You must not drive a car or operate equipment for at least 24 hours unless exempted by the attending physician. If you are dizzy for longer than 24 hours, notify your physician.    Medicine/Discomfort:    You can expect some discomfort when the local anesthetic wears off, if your health care provider ordered pain medicine, take it as prescribed.    Diet:    You may resume your normal diet. Do not drink alcoholic beverages for at least 24 hours following anesthesia and/or sedation.    When to call your health care provider:    Call your health care provider if you have any questions or concerns.  Leave dressing clean, dry and intact until your first postoperative visit.    You may heel weight bear as tolerated in your surgical boot only.    Take pain medications as prescribed.     Elevate surgical extremity above level of heart while at rest.     Contact doctor for increased pain, drainage, nausea, vomiting, fever or chills.

## 2018-05-16 NOTE — ANESTHESIA POSTPROCEDURE EVALUATION
Patient: Chanell Rubi Adinolfi    Procedure Summary     Date:  05/16/18 Room / Location:  [facility] OR 06 / [facility] OR    Anesthesia Start:  0720 Anesthesia Stop:  0834    Procedure:  FOOT EXCISION AND ABLATION PLANTAR WARTS (Left Foot) Diagnosis:       Plantar warts      (Plantar warts [B07.0])    Surgeon:  Alvin Back DPM Provider:  Eliot Montesinos MD    Anesthesia Type:  general ASA Status:  3          Anesthesia Type: general  Last vitals  BP   151/73 (05/16/18 0610)   Temp   97.6 °F (36.4 °C) (05/16/18 0610)   Pulse   87 (05/16/18 0610)   Resp   18 (05/16/18 0610)     SpO2   95 % (05/16/18 0610)     Post Anesthesia Care and Evaluation    Patient location during evaluation: PACU  Patient participation: complete - patient participated  Level of consciousness: awake and awake and alert  Pain management: adequate  Airway patency: patent  Anesthetic complications: No anesthetic complications    Cardiovascular status: acceptable  Respiratory status: acceptable  Hydration status: acceptable

## 2018-05-17 LAB
LAB AP CASE REPORT: NORMAL
Lab: NORMAL
PATH REPORT.FINAL DX SPEC: NORMAL
PATH REPORT.GROSS SPEC: NORMAL

## 2018-05-21 ENCOUNTER — OFFICE VISIT (OUTPATIENT)
Dept: PODIATRY | Facility: CLINIC | Age: 73
End: 2018-05-21

## 2018-05-21 VITALS — HEIGHT: 71 IN | WEIGHT: 208 LBS | BODY MASS INDEX: 29.12 KG/M2

## 2018-05-21 DIAGNOSIS — M79.672 LEFT FOOT PAIN: ICD-10-CM

## 2018-05-21 DIAGNOSIS — B07.0 PLANTAR WARTS: Primary | ICD-10-CM

## 2018-05-21 PROCEDURE — 99024 POSTOP FOLLOW-UP VISIT: CPT | Performed by: PODIATRIST

## 2018-05-21 NOTE — PROGRESS NOTES
Chanell Carrera  1945  73 y.o. female   Patient presents today for plantar wart of the left foot follow-up.   2018        Chief Complaint   Patient presents with   • Left Foot - Post-op           History of Present Illness    Chanell Carrera is a 73 y.o. female who presents For follow-up of left foot plantar verruca. Underwent ablation and curettage on 2018.  She is doing well overall.  Some pain to the surgical sites as noted.      Past Medical History:   Diagnosis Date   • Anxiety    • Arthritis    • C. difficile diarrhea     approx 5-6 yr ago   • Chronic back pain    • GERD (gastroesophageal reflux disease)    • Kidney atrophy     r/t pregnency    • Plantar wart    • Seizure     x 1, r/t medication   • Sleep apnea     does not wear c-pap         Past Surgical History:   Procedure Laterality Date   • BACK SURGERY      x 6   •  SECTION     • EXCISION FOOT TUMOR Left 2018    Procedure: FOOT EXCISION AND ABLATION PLANTAR WARTS;  Surgeon: Alvin Back DPM;  Location[facility];  Service: Podiatry   • HYSTERECTOMY     • NECK SURGERY     • VEIN SURGERY      removed between ovary and kidney         Family History   Problem Relation Age of Onset   • Cancer Mother    • Cancer Father    • Cancer Brother    • Cancer Maternal Grandmother    • Cancer Maternal Grandfather    • Cancer Paternal Grandmother    • Cancer Paternal Grandfather    • Cancer Sister          Social History     Social History   • Marital status:      Spouse name: N/A   • Number of children: N/A   • Years of education: N/A     Occupational History   • Not on file.     Social History Main Topics   • Smoking status: Never Smoker   • Smokeless tobacco: Never Used   • Alcohol use Yes      Comment: occasional   • Drug use: No   • Sexual activity: Defer     Other Topics Concern   • Not on file     Social History Narrative   • No narrative on file         Current Outpatient Prescriptions   Medication Sig  "Dispense Refill   • carvedilol (COREG) 12.5 MG tablet Take 12.5 mg by mouth 2 (Two) Times a Day With Meals.     • celecoxib (CeleBREX) 200 MG capsule Take 200 mg by mouth Daily.     • citalopram (CeleXA) 20 MG tablet Take 1 tablet by mouth daily. 90 tablet 1   • diphenhydrAMINE (BENADRYL) 25 mg capsule Take 25 mg by mouth Every 6 (Six) Hours As Needed for Itching.     • methocarbamol (ROBAXIN) 500 MG tablet Take 500 mg by mouth 2 (Two) Times a Day.     • Mirabegron ER (MYRBETRIQ) 50 MG tablet sustained-release 24 hour 24 hr tablet Take 50 mg by mouth Daily.     • oxyCODONE-acetaminophen (PERCOCET)  MG per tablet Take 1 tablet by mouth Every 6 (Six) Hours As Needed for Moderate Pain .     • pantoprazole (PROTONIX) 40 MG EC tablet Take 40 mg by mouth Daily.     • traZODone (DESYREL) 100 MG tablet Take 100 mg by mouth Every Night.     • zolpidem (AMBIEN) 10 MG tablet Take 10 mg by mouth At Night As Needed for Sleep.       No current facility-administered medications for this visit.          OBJECTIVE    Ht 180.3 cm (70.98\")   Wt 94.3 kg (208 lb)   BMI 29.02 kg/m²       Review of Systems   Constitutional:  Denies recent weight loss, weight gain, fever or chills, no change in exercise tolerance  Musculoskeletal: Toe/foot pain.   Skin: Plantar warts  Neurological: Denies paresthesias.  Psychiatric/Behavioral: Denies depression        Physical Exam   Constitutional: she appears well-developed and well-nourished.   CV: No chest pain. Normal RR  Resp: Non labored respirations  Psychiatric: she has a normal mood and affect. her behavior is normal.      Lower Extremity Exam:  Vascular: DP/PT pulses palpable 2+.   No edema  Toes warm  Neuro: Protective sensation intact, b/l.  DTRs intact  Integument: No open wounds.  No erythema, scaling  8-10  Or she'll thickness wounds to plantar left foot at site of prior verruca excision.  Wound bases are granular and there is no signs of infection.  Some tenderness to palpation is " noted.  Biopsy/autoinoculation sites well healed  Musculoskeletal: LE muscle strength 5/5.   Gait normal  Ankle ROM full without pain or crepitus  STJ ROM full without pain or crepitus  No digital deformities              ASSESSMENT AND PLAN    Chanell was seen today for post-op.    Diagnoses and all orders for this visit:    Plantar warts    Left foot pain      -Comprehensive foot and ankle exam performed  -Doing well overall.  Silvadene dressing change today.  -Continue surgical shoe for all ambulation.  -Recheck 1 week for dressing change, possible progression to home dressing changes at that time.          This document has been electronically signed by Alvin Back DPM on May 23, 2018 1:25 PM     EMR Dragon/Transcription disclaimer:   Much of this encounter note is an electronic transcription/translation of spoken language to printed text. The electronic translation of spoken language may permit erroneous, or at times, nonsensical words or phrases to be inadvertently transcribed; Although I have reviewed the note for such errors, some may still exist.    Alvin Back DPM  5/23/2018  1:25 PM

## 2018-05-30 ENCOUNTER — OFFICE VISIT (OUTPATIENT)
Dept: PODIATRY | Facility: CLINIC | Age: 73
End: 2018-05-30

## 2018-05-30 VITALS — BODY MASS INDEX: 29.1 KG/M2 | WEIGHT: 207.89 LBS | HEIGHT: 71 IN

## 2018-05-30 DIAGNOSIS — B07.0 PLANTAR WARTS: Primary | ICD-10-CM

## 2018-05-30 PROCEDURE — 99213 OFFICE O/P EST LOW 20 MIN: CPT | Performed by: PODIATRIST

## 2018-05-30 NOTE — PROGRESS NOTES
Chanell Carrera  1945  73 y.o. female   PCP- Dr. Mcgarry  Patient presents today for left foot post op.    2018    Chief Complaint   Patient presents with   • Left Foot - Follow-up       History of Present Illness    Chanell Carrera is a 73 y.o.female who presents to clinic for her second postoperative visit.  She had ablation of plantar warts left foot, date of surgery 2018.  Her dressing is clean, dry and intact and she is ambulating in a surgical shoe.  Currently rates her pain as a 4 out of 10.    Past Medical History:   Diagnosis Date   • Anxiety    • Arthritis    • C. difficile diarrhea     approx 5-6 yr ago   • Chronic back pain    • GERD (gastroesophageal reflux disease)    • Kidney atrophy     r/t pregnency    • Plantar wart    • Seizure     x 1, r/t medication   • Sleep apnea     does not wear c-pap         Past Surgical History:   Procedure Laterality Date   • BACK SURGERY      x 6   •  SECTION     • EXCISION FOOT TUMOR Left 2018    Procedure: FOOT EXCISION AND ABLATION PLANTAR WARTS;  Surgeon: Alvin Back DPM;  Location: [facility];  Service: Podiatry   • HYSTERECTOMY     • NECK SURGERY     • VEIN SURGERY      removed between ovary and kidney         Family History   Problem Relation Age of Onset   • Cancer Mother    • Cancer Father    • Cancer Brother    • Cancer Maternal Grandmother    • Cancer Maternal Grandfather    • Cancer Paternal Grandmother    • Cancer Paternal Grandfather    • Cancer Sister        No Known Allergies    Social History     Social History   • Marital status:      Spouse name: N/A   • Number of children: N/A   • Years of education: N/A     Occupational History   • Not on file.     Social History Main Topics   • Smoking status: Never Smoker   • Smokeless tobacco: Never Used   • Alcohol use Yes      Comment: occasional   • Drug use: No   • Sexual activity: Defer     Other Topics Concern   • Not on file     Social History Narrative  "  • No narrative on file         Current Outpatient Prescriptions   Medication Sig Dispense Refill   • carvedilol (COREG) 12.5 MG tablet Take 12.5 mg by mouth 2 (Two) Times a Day With Meals.     • celecoxib (CeleBREX) 200 MG capsule Take 200 mg by mouth Daily.     • citalopram (CeleXA) 20 MG tablet Take 1 tablet by mouth daily. 90 tablet 1   • diphenhydrAMINE (BENADRYL) 25 mg capsule Take 25 mg by mouth Every 6 (Six) Hours As Needed for Itching.     • methocarbamol (ROBAXIN) 500 MG tablet Take 500 mg by mouth 2 (Two) Times a Day.     • Mirabegron ER (MYRBETRIQ) 50 MG tablet sustained-release 24 hour 24 hr tablet Take 50 mg by mouth Daily.     • oxyCODONE-acetaminophen (PERCOCET)  MG per tablet Take 1 tablet by mouth Every 6 (Six) Hours As Needed for Moderate Pain .     • pantoprazole (PROTONIX) 40 MG EC tablet Take 40 mg by mouth Daily.     • traZODone (DESYREL) 100 MG tablet Take 100 mg by mouth Every Night.     • zolpidem (AMBIEN) 10 MG tablet Take 10 mg by mouth At Night As Needed for Sleep.       No current facility-administered medications for this visit.          OBJECTIVE    Ht 180.3 cm (70.98\")   Wt 94.3 kg (207 lb 14.3 oz)   BMI 29.01 kg/m²       Review of Systems   Constitutional: Negative for chills and fever.   Respiratory: Negative for chest tightness and shortness of breath.    Cardiovascular: Negative for chest pain and leg swelling.   Gastrointestinal: Negative for diarrhea, nausea and vomiting.   Musculoskeletal:        Left foot pain   Neurological: Negative.    Psychiatric/Behavioral: Negative.            Physical Exam   Constitutional: She is oriented to person, place, and time. She appears well-developed and well-nourished. No distress.   Eyes: EOM are normal. Pupils are equal, round, and reactive to light.   Pulmonary/Chest: Effort normal. No respiratory distress. She has no wheezes.   Neurological: She is alert and oriented to person, place, and time.   Psychiatric: She has a normal " mood and affect. Her behavior is normal.   Vitals reviewed.      Gait: normal    Assistive Device: none    Left Lower Extremity:     Vascular: DP/PT pulses palpable 2+.   No edema  Toes warm  Neuro: Protective sensation intact, b/l.  DTRs intact  Integument: No open wounds.  No erythema, scaling  8-10 partial thickness wounds to plantar left foot at site of prior verruca excision.  Wound bases are granular and there is no signs of infection.  Some tenderness to palpation is noted.  Biopsy/autoinoculation sites well healed  Musculoskeletal: LE muscle strength 5/5.   Gait normal  Ankle ROM full without pain or crepitus  STJ ROM full without pain or crepitus  No digital deformities         Procedures        ASSESSMENT AND PLAN    Chanell was seen today for follow-up.    Diagnoses and all orders for this visit:    Plantar warts      - Left foot dressed with Silvadene followed by dry sterile dressing.  Keep dressing clean, dry and intact  - Weightbearing as tolerated in offloading surgical shoe  - All her questions were answered  - Return to clinic in 1 week for follow-up with Dr. Back.          This document has been electronically signed by Loi Figueroa DPM on May 30, 2018 5:00 PM     5/30/2018  5:00 PM

## 2018-06-08 ENCOUNTER — OFFICE VISIT (OUTPATIENT)
Dept: PODIATRY | Facility: CLINIC | Age: 73
End: 2018-06-08

## 2018-06-08 VITALS — HEIGHT: 71 IN | BODY MASS INDEX: 29.1 KG/M2 | WEIGHT: 207.89 LBS

## 2018-06-08 DIAGNOSIS — B07.0 PLANTAR WARTS: Primary | ICD-10-CM

## 2018-06-08 PROCEDURE — 99212 OFFICE O/P EST SF 10 MIN: CPT | Performed by: PODIATRIST

## 2018-06-08 NOTE — PROGRESS NOTES
Chanell Carrera  1945  73 y.o. female   PCP- Dr. Mcgarry  Patient presents today for left foot post op.  2018        Chief Complaint   Patient presents with   • Left Foot - Post-op Follow-up       History of Present Illness    Chanell Carrera is a 73 y.o.female who presents to clinic for f/u ablation of plantar warts left foot, date of surgery 2018.  Her dressing is clean, dry and intact and she is ambulating in a surgical shoe.  Pain improving    Past Medical History:   Diagnosis Date   • Anxiety    • Arthritis    • C. difficile diarrhea     approx 5-6 yr ago   • Chronic back pain    • GERD (gastroesophageal reflux disease)    • Kidney atrophy     r/t pregnency    • Plantar wart    • Seizure     x 1, r/t medication   • Sleep apnea     does not wear c-pap         Past Surgical History:   Procedure Laterality Date   • BACK SURGERY      x 6   •  SECTION     • EXCISION FOOT TUMOR Left 2018    Procedure: FOOT EXCISION AND ABLATION PLANTAR WARTS;  Surgeon: Alvin Back DPM;  Location: [facility];  Service: Podiatry   • HYSTERECTOMY     • NECK SURGERY     • VEIN SURGERY      removed between ovary and kidney         Family History   Problem Relation Age of Onset   • Cancer Mother    • Cancer Father    • Cancer Brother    • Cancer Maternal Grandmother    • Cancer Maternal Grandfather    • Cancer Paternal Grandmother    • Cancer Paternal Grandfather    • Cancer Sister        No Known Allergies    Social History     Social History   • Marital status:      Spouse name: N/A   • Number of children: N/A   • Years of education: N/A     Occupational History   • Not on file.     Social History Main Topics   • Smoking status: Never Smoker   • Smokeless tobacco: Never Used   • Alcohol use Yes      Comment: occasional   • Drug use: No   • Sexual activity: Defer     Other Topics Concern   • Not on file     Social History Narrative   • No narrative on file         Current Outpatient  "Prescriptions   Medication Sig Dispense Refill   • carvedilol (COREG) 12.5 MG tablet Take 12.5 mg by mouth 2 (Two) Times a Day With Meals.     • celecoxib (CeleBREX) 200 MG capsule Take 200 mg by mouth Daily.     • citalopram (CeleXA) 20 MG tablet Take 1 tablet by mouth daily. 90 tablet 1   • diphenhydrAMINE (BENADRYL) 25 mg capsule Take 25 mg by mouth Every 6 (Six) Hours As Needed for Itching.     • methocarbamol (ROBAXIN) 500 MG tablet Take 500 mg by mouth 2 (Two) Times a Day.     • Mirabegron ER (MYRBETRIQ) 50 MG tablet sustained-release 24 hour 24 hr tablet Take 50 mg by mouth Daily.     • oxyCODONE-acetaminophen (PERCOCET)  MG per tablet Take 1 tablet by mouth Every 6 (Six) Hours As Needed for Moderate Pain .     • pantoprazole (PROTONIX) 40 MG EC tablet Take 40 mg by mouth Daily.     • traZODone (DESYREL) 100 MG tablet Take 100 mg by mouth Every Night.     • zolpidem (AMBIEN) 10 MG tablet Take 10 mg by mouth At Night As Needed for Sleep.       No current facility-administered medications for this visit.          OBJECTIVE    Ht 180.3 cm (70.98\")   Wt 94.3 kg (207 lb 14.3 oz)   BMI 29.01 kg/m²       Review of Systems   Constitutional: Negative for chills and fever.   Respiratory: Negative for chest tightness and shortness of breath.    Cardiovascular: Negative for chest pain and leg swelling.   Gastrointestinal: Negative for diarrhea, nausea and vomiting.   Musculoskeletal:        Left foot pain   Neurological: Negative.    Psychiatric/Behavioral: Negative.            Physical Exam   Constitutional: She is oriented to person, place, and time. She appears well-developed and well-nourished. No distress.   Eyes: EOM are normal. Pupils are equal, round, and reactive to light.   Pulmonary/Chest: Effort normal. No respiratory distress. She has no wheezes.   Neurological: She is alert and oriented to person, place, and time.   Psychiatric: She has a normal mood and affect. Her behavior is normal.   Vitals " reviewed.      Gait: normal    Assistive Device: none    Left Lower Extremity:     Vascular: DP/PT pulses palpable 2+.   No edema  Toes warm  Neuro: Protective sensation intact, b/l.  DTRs intact  Integument: No open wounds.  No erythema, scaling  8-10 partial thickness wounds to plantar left foot at site of prior verruca excision.  Wound bases are granular and there is no signs of infection.  Some tenderness to palpation is noted.  Musculoskeletal: LE muscle strength 5/5.   Gait normal  Ankle ROM full without pain or crepitus  STJ ROM full without pain or crepitus  No digital deformities         Procedures        ASSESSMENT AND PLAN    Chanell was seen today for post-op follow-up.    Diagnoses and all orders for this visit:    Plantar warts      -Doing well. Dressing change today  -Continue offloading shoe  - Return to clinic in 1 week          This document has been electronically signed by Alvin Back DPM on June 17, 2018 9:41 PM     6/17/2018  9:41 PM

## 2018-06-22 ENCOUNTER — OFFICE VISIT (OUTPATIENT)
Dept: PODIATRY | Facility: CLINIC | Age: 73
End: 2018-06-22

## 2018-06-22 DIAGNOSIS — M79.672 LEFT FOOT PAIN: ICD-10-CM

## 2018-06-22 DIAGNOSIS — L97.521 SKIN ULCER OF LEFT FOOT, LIMITED TO BREAKDOWN OF SKIN (HCC): Primary | ICD-10-CM

## 2018-06-22 DIAGNOSIS — B07.0 PLANTAR WARTS: ICD-10-CM

## 2018-06-22 PROCEDURE — 99213 OFFICE O/P EST LOW 20 MIN: CPT | Performed by: PODIATRIST

## 2018-06-22 RX ORDER — BACLOFEN 10 MG/1
10 TABLET ORAL 2 TIMES DAILY
COMMUNITY

## 2018-06-22 NOTE — PROGRESS NOTES
Chanell Carrera  1945  73 y.o. female   PCP- Dr. Harpreet Mcgarry      Patient presents for recheck foot excision and ablation plantar warts- LT foot. Date of surgery- 2018.          Chief Complaint   Patient presents with   • Left Foot - Follow-up       History of Present Illness    Chanell Carrera is a 73 y.o.female who presents to clinic for f/u ablation of plantar warts left foot, date of surgery 2018.  Her dressing is clean, dry and intact and she is ambulating in a surgical shoe.  Pain improving    Past Medical History:   Diagnosis Date   • Anxiety    • Arthritis    • C. difficile diarrhea     approx 5-6 yr ago   • Chronic back pain    • GERD (gastroesophageal reflux disease)    • Kidney atrophy     r/t pregnency    • Plantar wart    • Seizure     x 1, r/t medication   • Sleep apnea     does not wear c-pap         Past Surgical History:   Procedure Laterality Date   • BACK SURGERY      x 6   •  SECTION     • EXCISION FOOT TUMOR Left 2018    Procedure: FOOT EXCISION AND ABLATION PLANTAR WARTS;  Surgeon: Alvin Back DPM;  Location: [facility];  Service: Podiatry   • HYSTERECTOMY     • NECK SURGERY     • VEIN SURGERY      removed between ovary and kidney         Family History   Problem Relation Age of Onset   • Cancer Mother    • Cancer Father    • Cancer Brother    • Cancer Maternal Grandmother    • Cancer Maternal Grandfather    • Cancer Paternal Grandmother    • Cancer Paternal Grandfather    • Cancer Sister        No Known Allergies    Social History     Social History   • Marital status:      Spouse name: N/A   • Number of children: N/A   • Years of education: N/A     Occupational History   • Not on file.     Social History Main Topics   • Smoking status: Never Smoker   • Smokeless tobacco: Never Used   • Alcohol use Yes      Comment: occasional   • Drug use: No   • Sexual activity: Defer     Other Topics Concern   • Not on file     Social History  Narrative   • No narrative on file         Current Outpatient Prescriptions   Medication Sig Dispense Refill   • baclofen (LIORESAL) 10 MG tablet baclofen 10 mg tablet     • carvedilol (COREG) 12.5 MG tablet Take 12.5 mg by mouth 2 (Two) Times a Day With Meals.     • celecoxib (CeleBREX) 200 MG capsule Take 200 mg by mouth Daily.     • citalopram (CeleXA) 20 MG tablet Take 1 tablet by mouth daily. 90 tablet 1   • diphenhydrAMINE (BENADRYL) 25 mg capsule Take 25 mg by mouth Every 6 (Six) Hours As Needed for Itching.     • Mirabegron ER (MYRBETRIQ) 50 MG tablet sustained-release 24 hour 24 hr tablet Take 50 mg by mouth Daily.     • oxyCODONE-acetaminophen (PERCOCET)  MG per tablet Take 1 tablet by mouth Every 6 (Six) Hours As Needed for Moderate Pain .     • pantoprazole (PROTONIX) 40 MG EC tablet Take 40 mg by mouth Daily.     • traZODone (DESYREL) 100 MG tablet Take 100 mg by mouth Every Night.     • zolpidem (AMBIEN) 10 MG tablet Take 10 mg by mouth At Night As Needed for Sleep.       No current facility-administered medications for this visit.          OBJECTIVE    There were no vitals taken for this visit.      Review of Systems   Constitutional: Negative for chills and fever.   Respiratory: Negative for chest tightness and shortness of breath.    Cardiovascular: Negative for chest pain and leg swelling.   Gastrointestinal: Negative for diarrhea, nausea and vomiting.   Musculoskeletal:        Left foot pain   Neurological: Negative.    Psychiatric/Behavioral: Negative.            Physical Exam   Constitutional: She is oriented to person, place, and time. She appears well-developed and well-nourished. No distress.   Eyes: EOM are normal. Pupils are equal, round, and reactive to light.   Pulmonary/Chest: Effort normal. No respiratory distress. She has no wheezes.   Neurological: She is alert and oriented to person, place, and time.   Psychiatric: She has a normal mood and affect. Her behavior is normal.    Vitals reviewed.      Gait: normal    Assistive Device: none    Left Lower Extremity:     Vascular: DP/PT pulses palpable 2+.   No edema  Toes warm  Neuro: Protective sensation intact, b/l.  DTRs intact  Integument: No open wounds.  No erythema, scaling  3 x 1.5 cm partial thickness ulcer to plantar lateral midfoot at prior ablation site.  Wound bases are granular and there is no signs of infection.  Some tenderness to palpation is noted.  Musculoskeletal: LE muscle strength 5/5.   Gait normal  Ankle ROM full without pain or crepitus  STJ ROM full without pain or crepitus  No digital deformities         Procedures        ASSESSMENT AND PLAN    Chanell was seen today for follow-up.    Diagnoses and all orders for this visit:    Skin ulcer of left foot, limited to breakdown of skin    Plantar warts    Left foot pain      -Doing well. Dressing change today  -Continue offloading shoe  - Return to clinic in 2 week          This document has been electronically signed by Alvin Back DPM on June 27, 2018 8:48 PM     6/27/2018  8:48 PM

## 2018-06-24 NOTE — ANESTHESIA PREPROCEDURE EVALUATION
Anesthesia Evaluation     no history of anesthetic complications:  NPO Solid Status: > 8 hours  NPO Liquid Status: > 2 hours           Airway   Mallampati: II  TM distance: >3 FB  Neck ROM: full  Possible difficult intubation  Dental    (+) edentulous    Pulmonary     breath sounds clear to auscultation  (+) sleep apnea, decreased breath sounds,   Cardiovascular - normal exam  Exercise tolerance: poor (<4 METS)    ECG reviewed  Patient on routine beta blocker and Beta blocker given within 24 hours of surgery  Rhythm: regular  Rate: normal    (+) hypertension less than 2 medications,   (-) dysrhythmias    ROS comment: Normal sinus rhythm  Normal ECG  When compared with ECG of 29-JAN-2013 01:55,  No significant change was found  Confirmed by AKRAM    Neuro/Psych  (+) seizures (once in 2010) well controlled, headaches (migraines controlled), psychiatric history Anxiety and Depression,     GI/Hepatic/Renal/Endo    (+)  GERD well controlled,  renal disease CRI,     ROS Comment: Recurrent UTIs  incontinence    Musculoskeletal     (+) back pain, chronic pain,   Abdominal  - normal exam   Substance History - negative use     OB/GYN          Other   (+) arthritis (hips and hands)                     Anesthesia Plan    ASA 3     general     intravenous induction   Anesthetic plan and risks discussed with patient and spouse/significant other.      
Home

## 2018-07-06 ENCOUNTER — OFFICE VISIT (OUTPATIENT)
Dept: PODIATRY | Facility: CLINIC | Age: 73
End: 2018-07-06

## 2018-07-06 VITALS — OXYGEN SATURATION: 92 % | HEIGHT: 71 IN | BODY MASS INDEX: 28.98 KG/M2 | WEIGHT: 207 LBS | HEART RATE: 85 BPM

## 2018-07-06 DIAGNOSIS — L97.521 SKIN ULCER OF LEFT FOOT, LIMITED TO BREAKDOWN OF SKIN (HCC): ICD-10-CM

## 2018-07-06 DIAGNOSIS — B07.0 PLANTAR WARTS: Primary | ICD-10-CM

## 2018-07-06 PROCEDURE — 99212 OFFICE O/P EST SF 10 MIN: CPT | Performed by: PODIATRIST

## 2018-07-06 NOTE — PROGRESS NOTES
Chanell Carrera  1945  73 y.o. female     Patient presents for post op recheck of her left foot.    Chief Complaint   Patient presents with   • Left Foot - post op recheck       History of Present Illness    Chanell Carrera is a 73 y.o.female who presents to clinic for f/u ablation of plantar warts left foot, date of surgery 2018.  Her pain has significantly improved.  She has converted to just placing a small Band-Aid over the small residual wound on the bottom of her foot.  She has returned to regular shoes without issue.    Past Medical History:   Diagnosis Date   • Anxiety    • Arthritis    • C. difficile diarrhea     approx 5-6 yr ago   • Chronic back pain    • GERD (gastroesophageal reflux disease)    • Kidney atrophy     r/t pregnency    • Plantar wart    • Seizure (CMS/HCC)     x 1, r/t medication   • Sleep apnea     does not wear c-pap         Past Surgical History:   Procedure Laterality Date   • BACK SURGERY      x 6   •  SECTION     • EXCISION FOOT TUMOR Left 2018    Procedure: FOOT EXCISION AND ABLATION PLANTAR WARTS;  Surgeon: Alvin Back DPM;  Location: [facility];  Service: Podiatry   • HYSTERECTOMY     • NECK SURGERY     • VEIN SURGERY      removed between ovary and kidney         Family History   Problem Relation Age of Onset   • Cancer Mother    • Cancer Father    • Cancer Brother    • Cancer Maternal Grandmother    • Cancer Maternal Grandfather    • Cancer Paternal Grandmother    • Cancer Paternal Grandfather    • Cancer Sister        No Known Allergies    Social History     Social History   • Marital status:      Spouse name: N/A   • Number of children: N/A   • Years of education: N/A     Occupational History   • Not on file.     Social History Main Topics   • Smoking status: Never Smoker   • Smokeless tobacco: Never Used   • Alcohol use Yes      Comment: occasional   • Drug use: No   • Sexual activity: Defer     Other Topics Concern   • Not on  "file     Social History Narrative   • No narrative on file         Current Outpatient Prescriptions   Medication Sig Dispense Refill   • baclofen (LIORESAL) 10 MG tablet baclofen 10 mg tablet     • carvedilol (COREG) 12.5 MG tablet Take 12.5 mg by mouth 2 (Two) Times a Day With Meals.     • celecoxib (CeleBREX) 200 MG capsule Take 200 mg by mouth Daily.     • citalopram (CeleXA) 20 MG tablet Take 1 tablet by mouth daily. 90 tablet 1   • diphenhydrAMINE (BENADRYL) 25 mg capsule Take 25 mg by mouth Every 6 (Six) Hours As Needed for Itching.     • Mirabegron ER (MYRBETRIQ) 50 MG tablet sustained-release 24 hour 24 hr tablet Take 50 mg by mouth Daily.     • oxyCODONE-acetaminophen (PERCOCET)  MG per tablet Take 1 tablet by mouth Every 6 (Six) Hours As Needed for Moderate Pain .     • pantoprazole (PROTONIX) 40 MG EC tablet Take 40 mg by mouth Daily.     • traZODone (DESYREL) 100 MG tablet Take 100 mg by mouth Every Night.     • zolpidem (AMBIEN) 10 MG tablet Take 10 mg by mouth At Night As Needed for Sleep.       No current facility-administered medications for this visit.          OBJECTIVE    Pulse 85   Ht 180.3 cm (71\")   Wt 93.9 kg (207 lb)   SpO2 92%   BMI 28.87 kg/m²       Review of Systems   Constitutional: Negative.    HENT: Negative.    Eyes: Negative.    Respiratory: Negative.    Cardiovascular: Negative.    Gastrointestinal: Negative.    Endocrine: Negative.    Genitourinary: Negative.    Musculoskeletal: Negative.         Left foot pain   Skin: Negative.    Neurological: Negative.    Psychiatric/Behavioral: Negative.            Physical Exam   Constitutional: She is oriented to person, place, and time. She appears well-developed and well-nourished. No distress.   Eyes: EOM are normal. Pupils are equal, round, and reactive to light.   Pulmonary/Chest: Effort normal. No respiratory distress. She has no wheezes.   Neurological: She is alert and oriented to person, place, and time.   Psychiatric: She " has a normal mood and affect. Her behavior is normal.   Vitals reviewed.      Gait: normal    Assistive Device: none    Left Lower Extremity:     Vascular: DP/PT pulses palpable 2+.   No edema  Toes warm  Neuro: Protective sensation intact, b/l.  DTRs intact  Integument: No open wounds.  No erythema, scaling  1 x 0.2 cm partial thickness ulcer to plantar lateral midfoot at prior ablation site.  Wound bases are granular and there is no signs of infection.  Minimal tenderness to palpation is noted.  Musculoskeletal: LE muscle strength 5/5.   Gait normal  Ankle ROM full without pain or crepitus  STJ ROM full without pain or crepitus  No digital deformities         Procedures        ASSESSMENT AND PLAN    Chanell was seen today for post op recheck.    Diagnoses and all orders for this visit:    Plantar warts    Skin ulcer of left foot, limited to breakdown of skin (CMS/HCC)      -Doing well.  -Continue regular shoes, activity as tolerated  -Recheck as needed          This document has been electronically signed by Alvin Back DPM on July 11, 2018 5:04 PM     7/11/2018  5:04 PM

## 2018-12-12 ENCOUNTER — TRANSCRIBE ORDERS (OUTPATIENT)
Dept: PHYSICAL THERAPY | Facility: HOSPITAL | Age: 73
End: 2018-12-12

## 2018-12-12 DIAGNOSIS — M76.31 IT BAND SYNDROME, RIGHT: Primary | ICD-10-CM

## 2018-12-17 ENCOUNTER — APPOINTMENT (OUTPATIENT)
Dept: PHYSICAL THERAPY | Facility: HOSPITAL | Age: 73
End: 2018-12-17

## 2018-12-18 ENCOUNTER — HOSPITAL ENCOUNTER (OUTPATIENT)
Dept: PHYSICAL THERAPY | Facility: HOSPITAL | Age: 73
Setting detail: THERAPIES SERIES
Discharge: HOME | End: 2018-12-18

## 2018-12-18 DIAGNOSIS — M76.31 IT BAND SYNDROME, RIGHT: Primary | ICD-10-CM

## 2018-12-18 PROCEDURE — G8978 MOBILITY CURRENT STATUS: HCPCS | Performed by: PHYSICAL THERAPIST

## 2018-12-18 PROCEDURE — G8979 MOBILITY GOAL STATUS: HCPCS | Performed by: PHYSICAL THERAPIST

## 2018-12-18 PROCEDURE — 97162 PT EVAL MOD COMPLEX 30 MIN: CPT | Performed by: PHYSICAL THERAPIST

## 2018-12-18 NOTE — THERAPY EVALUATION
Outpatient Physical Therapy Ortho Initial Evaluation  [facility]     Patient Name: Chanell Rubi Adinolfi  : 1945  MRN: [id number]  Today's Date: 2018      Visit Date: 2018  Visit   Return to MD: RIGO  Re-cert date: 19  Patient Active Problem List   Diagnosis   • Plantar warts        Past Medical History:   Diagnosis Date   • Anxiety    • Arthritis    • C. difficile diarrhea     approx 5-6 yr ago   • Chronic back pain    • Fibromyalgia    • GERD (gastroesophageal reflux disease)    • Kidney atrophy     r/t pregnency    • Plantar wart    • Seizure (CMS/HCC)     x 1, r/t medication   • Sleep apnea     does not wear c-pap        Past Surgical History:   Procedure Laterality Date   • BACK SURGERY      x 6   •  SECTION     • HYSTERECTOMY     • NECK SURGERY     • VEIN SURGERY      removed between ovary and kidney       Visit Dx:     ICD-10-CM ICD-9-CM   1. It band syndrome, right M76.31 728.89          Medications (Admitted on 2018)     baclofen (LIORESAL) 10 MG tablet     carvedilol (COREG) 12.5 MG tablet     celecoxib (CeleBREX) 200 MG capsule     citalopram (CeleXA) 20 MG tablet     diphenhydrAMINE (BENADRYL) 25 mg capsule     Mirabegron ER (MYRBETRIQ) 50 MG tablet sustained-release 24 hour 24 hr tablet     oxyCODONE-acetaminophen (PERCOCET)  MG per tablet     pantoprazole (PROTONIX) 40 MG EC tablet     traZODone (DESYREL) 100 MG tablet     zolpidem (AMBIEN) 10 MG tablet      Allergies: NKA    PT Ortho     Row Name 18 1400       Subjective Comments    Subjective Comments  72 yo female with intermittent right LE pain and right sided back pain afte a fall 6 weeks ago. Reports R IT band region pain as well as intermittent R foot pain. Distant MRI (in ) showing evidence of severe lumbar DDD and h/o T12 vertebroplasty.  H/o multiple falls.   -BS       Precautions and Contraindications    Precautions/Limitations  fall precautions  -BS        Subjective Pain    Able to rate subjective pain?  yes  -BS    Pre-Treatment Pain Level  0  -BS    Post-Treatment Pain Level  6  -BS    Subjective Pain Comment  intermittent 8/10 R hip and proximal lateral thigh region pain-at worst  -BS       Posture/Observations    Posture/Observations Comments  antalgic gait pattern with R LE with use of tripod cane  -BS       Special Tests/Palpation    Special Tests/Palpation  Hip  -BS       Hip Special Tests    BLADIMIR (hip vs SI pathology)  Bilateral:;Positive  -BS    Hilary’s test (tightness of ITB)  Right:;Positive  -BS    FAIR test (piriformis syndrome)  Right:;Negative  -BS       General ROM    GENERAL ROM COMMENTS  AROM: R hip flex 91°, R knee 0-125° L hip flex 96° L knee 0-126°   -BS       MMT (Manual Muscle Testing)    General MMT Comments  MMT: R LE-hip flex 4-/5 hip abd 4/5 knee ext 3+/5 knee flex 5/5 ankle DF 4/5 L LE-hip flex 4+/5 hip abd knee ext 4/5 knee flex 5/5 ankle DF 5/5  -BS       Sensation    Light Touch  Partial deficits in the RLE  -BS      User Key  (r) = Recorded By, (t) = Taken By, (c) = Cosigned By    Initials Name Provider Type    Demario Zapata, PT Physical Therapist                      Therapy Education  Education Details: standing IT band S, S/L R SKC stretch  Given: HEP  Program: New  How Provided: Verbal, Demonstration, Written  Provided to: Patient, Other (comment)(pt's  present)  Level of Understanding: Verbalized, Demonstrated     PT OP Goals     Row Name 12/18/18 1400          PT Short Term Goals    STG Date to Achieve  01/01/19  -BS     STG 1  Pt independent with HEP for LE stretching/strengthening  -BS     STG 1 Progress  New  -BS     STG 2  Improve right hip flexion AROM to 95°.  -BS     STG 2 Progress  New  -BS     STG 3  Improve R LE MMT (hip flex/abd, knee ext) to 4+/5   -BS     STG 3 Progress  New  -BS     STG 4  Reduce right sided back and R hip pain by 25% with standing and walking  -BS     STG 4 Progress  New  -BS        Long  Term Goals    LTG Date to Achieve  01/15/19  -BS     LTG 1  Improve right hip flexion AROM to 100°.  -BS     LTG 1 Progress  New  -BS     LTG 2  Improve R LE MMT (hip flex/abd, knee ext) to 4+/5   -BS     LTG 2 Progress  New  -BS     LTG 3  Reduce right sided back and R hip pain by 50% with standing and walking  -BS     LTG 3 Progress  New  -BS     LTG 4  Improve LEFS score to 19 or higher  -BS     LTG 4 Progress  New  -BS        Time Calculation    PT Goal Re-Cert Due Date  01/08/19  -BS       User Key  (r) = Recorded By, (t) = Taken By, (c) = Cosigned By    Initials Name Provider Type    BS Demario Lopez, PT Physical Therapist          PT Assessment/Plan     Row Name 12/18/18 1600          PT Assessment    Functional Limitations  Impaired gait;Performance in work activities;Performance in sport activities;Performance in self-care ADL;Performance in leisure activities;Limitation in home management  -BS     Impairments  Sensation;Range of motion;Posture;Pain;Muscle strength;Gait  -BS     Assessment Comments  right sided hip/proximal lateral R thigh pain due to IT band syndrome.  -BS     Please refer to paper survey for additional self-reported information  Yes  -BS     Rehab Potential  Fair  -BS     Patient/caregiver participated in establishment of treatment plan and goals  Yes  -BS     Patient would benefit from skilled therapy intervention  Yes  -BS        PT Plan    PT Frequency  1x/week $40 co-pay  -BS     Predicted Duration of Therapy Intervention (Therapy Eval)  4-6 weeks  -BS     Planned CPT's?  PT EVAL MOD COMPLELITY: 96087;PT RE-EVAL: 56187;PT THER PROC EA 15 MIN: 55168;PT THER ACT EA 15 MIN: 61773;PT MANUAL THERAPY EA 15 MIN: 72740;PT NEUROMUSC RE-EDUCATION EA 15 MIN: 08595;PT HOT OR COLD PACK TREAT MCARE;PT ELECTRICAL STIM UNATTEND: ;PT ULTRASOUND EA 15 MIN: 53654;PT THER SUPP EA 15 MIN  -BS     Physical Therapy Interventions (Optional Details)  aquatics exercise;balance training;home exercise  "program;lumbar stabilization;manual therapy techniques;modalities;neuromuscular re-education;patient/family education;ROM (Range of Motion);stair training;strengthening;stretching;transfer training  -BS     PT Plan Comments  f/u with ITB and R piriformis stretching, clam shell ex. Avoid right sidelying position.   -BS       User Key  (r) = Recorded By, (t) = Taken By, (c) = Cosigned By    Initials Name Provider Type    Demario Zapata, PT Physical Therapist            Exercises     Row Name 12/18/18 1400             Subjective Comments    Subjective Comments  74 yo female with intermittent right LE pain and right sided back pain afte a fall 6 weeks ago. Reports R IT band region pain as well as intermittent R foot pain. Distant MRI (in 2013) showing evidence of severe lumbar DDD and h/o T12 vertebroplasty.  H/o multiple falls.   -BS         Subjective Pain    Able to rate subjective pain?  yes  -BS      Pre-Treatment Pain Level  0  -BS      Post-Treatment Pain Level  6  -BS      Subjective Pain Comment  intermittent 8/10 R hip and proximal lateral thigh region pain-at worst  -BS         Exercise 1    Exercise Name 1  S/L quad stretch  -BS      Reps 1  demonstration only  -BS         Exercise 2    Exercise Name 2  S/L right SKC stretch  -BS      Sets 2  1  -BS      Reps 2  5  -BS      Time 2  increased R buttocks/R hip pain  -BS         Exercise 3    Exercise Name 3  standing R ITB S at wall  -BS      Sets 3  1  -BS      Reps 3  2  -BS      Time 3  30\" hold  -BS        User Key  (r) = Recorded By, (t) = Taken By, (c) = Cosigned By    Initials Name Provider Type    Demario Zapata, PT Physical Therapist                        Outcome Measure Options: Lower Extremity Functional Scale (LEFS)  Lower Extremity Functional Index  Any of your usual work, housework or school activities: Quite a bit of difficulty  Your usual hobbies, recreational or sporting activities: Quite a bit of difficulty  Getting into or out of the " bath: Extreme difficulty or unable to perform activity  Walking between rooms: Quite a bit of difficulty  Putting on your shoes or socks: Quite a bit of difficulty  Squatting: Extreme difficulty or unable to perform activity  Lifting an object, like a bag of groceries from the floor: Quite a bit of difficulty  Performing light activities around your home: Quite a bit of difficulty  Performing heavy activities around your home: Extreme difficulty or unable to perform activity  Getting into or out of a car: Quite a bit of difficulty  Walking 2 blocks: Extreme difficulty or unable to perform activity  Walking a mile: Extreme difficulty or unable to perform activity  Going up or down 10 stairs (about 1 flight of stairs): Extreme difficulty or unable to perform activity  Standing for 1 hour: Quite a bit of difficulty  Sitting for 1 hour: Quite a bit of difficulty  Running on even ground: Extreme difficulty or unable to perform activity  Running on uneven ground: Extreme difficulty or unable to perform activity  Making sharp turns while running fast: Extreme difficulty or unable to perform activity  Hopping: Extreme difficulty or unable to perform activity  Rolling over in bed: Quite a bit of difficulty  Total: 10      Time Calculation:     Therapy Suggested Charges     Code   Minutes Charges    None             Start Time: 1400  Stop Time: 1438  Time Calculation (min): 38 min  Total Timed Code Minutes- PT: 38 minute(s)     Therapy Charges for Today     Code Description Service Date Service Provider Modifiers Qty    82270861710 HC PT MOBILITY CURRENT 12/18/2018 Demario Lopez, PT GP, CM 1    52177551620 HC PT MOBILITY PROJECTED 12/18/2018 Demario Lopez, PT GP, CL 1    18333233107 HC PT EVAL MOD COMPLEXITY 3 12/18/2018 Demario Lopez, PT GP 1          PT G-Codes  Outcome Measure Options: Lower Extremity Functional Scale (LEFS)  Total: 10  Functional Limitation: Mobility: Walking and moving around  Mobility: Walking and  Moving Around Current Status (): At least 80 percent but less than 100 percent impaired, limited or restricted  Mobility: Walking and Moving Around Goal Status (): At least 60 percent but less than 80 percent impaired, limited or restricted         Demario Lopez, PT  12/18/2018

## 2018-12-19 ENCOUNTER — APPOINTMENT (OUTPATIENT)
Dept: PHYSICAL THERAPY | Facility: HOSPITAL | Age: 73
End: 2018-12-19

## 2019-01-02 ENCOUNTER — HOSPITAL ENCOUNTER (OUTPATIENT)
Dept: PHYSICAL THERAPY | Facility: HOSPITAL | Age: 74
Setting detail: THERAPIES SERIES
Discharge: HOME | End: 2019-01-02

## 2019-01-02 DIAGNOSIS — M76.31 IT BAND SYNDROME, RIGHT: Primary | ICD-10-CM

## 2019-01-02 PROCEDURE — 97110 THERAPEUTIC EXERCISES: CPT

## 2019-01-02 NOTE — THERAPY TREATMENT NOTE
Outpatient Physical Therapy Ortho Treatment Note  [facility]     Patient Name: Chanell Rubi Adinolfi  : 1945  MRN: [id number]  Today's Date: 2019      Visit Date: 2019  Pt reports 6/10 pain pre treatment,6 /10 pain post treatment  Reports 0% of improvement.  Attended 2/2 visits.  Insurance available: Medicare guidelines/40$  Next MD appt: TBBRODY .  Recertification: 2019.  Visit Dx:    ICD-10-CM ICD-9-CM   1. It band syndrome, right M76.31 728.89       Patient Active Problem List   Diagnosis   • Plantar warts        Past Medical History:   Diagnosis Date   • Anxiety    • Arthritis    • C. difficile diarrhea     approx 5-6 yr ago   • Chronic back pain    • Fibromyalgia    • GERD (gastroesophageal reflux disease)    • Kidney atrophy     r/t pregnency    • Plantar wart    • Seizure (CMS/HCC)     x 1, r/t medication   • Sleep apnea     does not wear c-pap        Past Surgical History:   Procedure Laterality Date   • BACK SURGERY      x 6   •  SECTION     • EXCISION FOOT TUMOR Left 2018    Procedure: FOOT EXCISION AND ABLATION PLANTAR WARTS;  Surgeon: Alvin Back DPM;  Location: [facility];  Service: Podiatry   • HYSTERECTOMY     • NECK SURGERY     • VEIN SURGERY      removed between ovary and kidney                       PT Assessment/Plan     Row Name 19 1600          PT Assessment    Assessment Comments  No new goals met today. Pt tolerated treatment of pro ll this date. Pt only able to perform 7 reps of clams. pt not a candiate for aquatics.  -TL        PT Plan    PT Frequency  1x/week 40.00 co pay  -TL     PT Plan Comments  add supine heelslides and hip abd in standing  -TL       User Key  (r) = Recorded By, (t) = Taken By, (c) = Cosigned By    Initials Name Provider Type    TL Chula Oconnell PTA Physical Therapy Assistant          Modalities     Row Name 19 1500             Moist Heat    MH Applied  Yes  -TL      Location  right hip   -TL      Rx Minutes  10 mins  -TL      MH S/P Rx  Yes  -TL        User Key  (r) = Recorded By, (t) = Taken By, (c) = Cosigned By    Initials Name Provider Type    Chula Pineda PTA Physical Therapy Assistant          Exercises     Row Name 01/02/19 1500             Precautions    Existing Precautions/Restrictions  seizures  -TL         Subjective Pain    Able to rate subjective pain?  yes  -TL      Pre-Treatment Pain Level  6  -TL      Post-Treatment Pain Level  6  -TL         Exercise 1    Exercise Name 1  pro ll Legs  -TL      Time 1  10  -TL      Additional Comments  level 2  -TL         Exercise 2    Exercise Name 2  B ham S  -TL      Reps 2  2  -TL      Time 2  30 sec hold  -TL         Exercise 3    Exercise Name 3  IT band S  -TL      Reps 3  2  -TL      Time 3  30 sec hold  -TL         Exercise 4    Exercise Name 4  seated pirif s  -TL      Reps 4  2  -TL      Time 4  30 sec hold  -TL         Exercise 5    Exercise Name 5  sidelying on left clams  -TL      Reps 5  7  -TL      Additional Comments  with pillow between knees  -TL        User Key  (r) = Recorded By, (t) = Taken By, (c) = Cosigned By    Initials Name Provider Type    Chula Pineda PTA Physical Therapy Assistant                         PT OP Goals     Row Name 01/02/19 1600          PT Short Term Goals    STG Date to Achieve  01/01/19  -TL     STG 1  Pt independent with HEP for LE stretching/strengthening  -TL     STG 1 Progress  Ongoing  -TL     STG 2  Improve right hip flexion AROM to 95°.  -TL     STG 2 Progress  Ongoing  -TL     STG 3  Improve R LE MMT (hip flex/abd, knee ext) to 4+/5   -TL     STG 3 Progress  Ongoing  -TL     STG 4  Reduce right sided back and R hip pain by 25% with standing and walking  -TL     STG 4 Progress  Ongoing  -TL        Long Term Goals    LTG Date to Achieve  01/15/19  -TL     LTG 1  Improve right hip flexion AROM to 100°.  -TL     LTG 1 Progress  Ongoing  -TL     LTG 2  Improve R LE MMT (hip flex/abd,  knee ext) to 4+/5   -TL     LTG 2 Progress  Ongoing  -TL     LTG 3  Reduce right sided back and R hip pain by 50% with standing and walking  -TL     LTG 3 Progress  Ongoing  -TL     LTG 4  Improve LEFS score to 19 or higher  -TL     LTG 4 Progress  New  -TL        Time Calculation    PT Goal Re-Cert Due Date  01/08/19  -TL       User Key  (r) = Recorded By, (t) = Taken By, (c) = Cosigned By    Initials Name Provider Type    TL Chula Oconnell PTA Physical Therapy Assistant          Therapy Education  Education Details: clams  Given: HEP, Symptoms/condition management, Pain management  Program: New, Reinforced  How Provided: Verbal, Demonstration, Written  Provided to: Patient  Level of Understanding: Verbalized, Demonstrated              Time Calculation:   Start Time: 1521  Stop Time: 1615  Time Calculation (min): 54 min  PT Non-Billable Time (min): 10 min  Total Timed Code Minutes- PT: 44 minute(s)  Therapy Suggested Charges     Code   Minutes Charges    None           Therapy Charges for Today     Code Description Service Date Service Provider Modifiers Qty    04992716738 HC PT THER PROC EA 15 MIN 1/2/2019 Chula Oconnell PTA GP 3    74558279276 HC PT THER SUPP EA 15 MIN 1/2/2019 Chula Oconnell PTA GP 1                    Chula Oconnell PTA  1/2/2019

## 2019-01-08 ENCOUNTER — HOSPITAL ENCOUNTER (OUTPATIENT)
Dept: PHYSICAL THERAPY | Facility: HOSPITAL | Age: 74
Setting detail: THERAPIES SERIES
Discharge: HOME | End: 2019-01-08

## 2019-01-08 DIAGNOSIS — M76.31 IT BAND SYNDROME, RIGHT: Primary | ICD-10-CM

## 2019-01-08 PROCEDURE — 97110 THERAPEUTIC EXERCISES: CPT | Performed by: PHYSICAL THERAPIST

## 2019-01-08 NOTE — THERAPY DISCHARGE NOTE
Outpatient Physical Therapy Ortho Progress Note/Discharge Summary  [facility]  Laxmi Back, PT, DPT, CSCS       Patient Name: Chanell Rubi Adinolfi  : 1945  MRN: [id number]  Today's Date: 2019      Visit Date: 2019    Pt reports 8/10 pain pre treatment,6 /10 pain post treatment  Reports 0% of improvement.  Attended 3/3 visits.  Insurance available: Medicare guidelines/40$  Next MD appt: TBD .  Recertification: N/A      Visit Dx:    ICD-10-CM ICD-9-CM   1. It band syndrome, right M76.31 728.89       Patient Active Problem List   Diagnosis   • Plantar warts        Past Medical History:   Diagnosis Date   • Anxiety    • Arthritis    • C. difficile diarrhea     approx 5-6 yr ago   • Chronic back pain    • Fibromyalgia    • GERD (gastroesophageal reflux disease)    • Kidney atrophy     r/t pregnency    • Plantar wart    • Seizure (CMS/HCC)     x 1, r/t medication   • Sleep apnea     does not wear c-pap        Past Surgical History:   Procedure Laterality Date   • BACK SURGERY      x 6   •  SECTION     • EXCISION FOOT TUMOR Left 2018    Procedure: FOOT EXCISION AND ABLATION PLANTAR WARTS;  Surgeon: Alvin Back DPM;  Location: [facility] OR;  Service: Podiatry   • HYSTERECTOMY     • NECK SURGERY     • VEIN SURGERY      removed between ovary and kidney     Number of days off work: N/A    Changes to medications: None noted.    Changes to MD orders: None noted.    PT Ortho     Row Name 19 1500       Subjective Comments    Subjective Comments  Patient reports that she feels okay for a day or so after PT but it doesn't last. She reports it takes her over an hour to get dressed sometimes. She reports she doesn't feel like PT is helping. Patient also reports pain goes from her LB and down to her foot at times.  -AJ       Precautions and Contraindications    Precautions/Limitations  seizure precautions;fall precautions  (Significant)   -AJ        "Subjective Pain    Able to rate subjective pain?  yes  -AJ    Pre-Treatment Pain Level  8  -AJ    Post-Treatment Pain Level  6  -AJ       Posture/Observations    Posture/Observations Comments  Slowed gait with SC, short shuffling steps, grabs R hip at times and verbalizes\" owe\" every 4-10 steps  -       Hip Special Tests    BLADIMIR (hip vs SI pathology)  Bilateral:;Positive  -AJ    Hilary’s test (tightness of ITB)  Right:;Positive  -AJ       General ROM    GENERAL ROM COMMENTS  AROM B hips flexion 95°, B knees full AROM.  -AJ       MMT (Manual Muscle Testing)    General MMT Comments  MMT deferred, patient does not tolerate today.  -AJ       Sensation    Light Touch  Partial deficits in the RUE  -AJ      User Key  (r) = Recorded By, (t) = Taken By, (c) = Cosigned By    Initials Name Provider Type    Laxmi Hernandez, PT Physical Therapist         Barriers to Rehab: Include significant or possible arthritic/degenerative changes that have occurred within the joint/spine, The patient's generally deconditioned state, Possible poor/questionable compliance with HEP.    Safety Issues: Fall risk, Seizure risk      PT Assessment/Plan     Row Name 01/08/19 1500          PT Assessment    Functional Limitations  Impaired gait;Performance in work activities;Performance in sport activities;Performance in self-care ADL;Performance in leisure activities;Limitation in home management  -     Impairments  Sensation;Range of motion;Posture;Pain;Muscle strength;Gait  -     Assessment Comments  Patient has poor overall tolerance and cannot even tolerate stretvches on land. is currently contraindicated for aquatics. Also discussed with patient that s/s could be from LB and not ITB. Also discussed with patient about following back up with referring provider and with low back doctor.  -AJ     Rehab Potential  Poor  -AJ     Patient would benefit from skilled therapy intervention  No  -AJ        PT Plan    PT Frequency  -- N/A  -AJ  "    Predicted Duration of Therapy Intervention (Therapy Eval)  N/A  -AJ     PT Plan Comments  D/C today with a final HEP.  -AJ       User Key  (r) = Recorded By, (t) = Taken By, (c) = Cosigned By    Initials Name Provider Type    Laxmi Hernandez, PT Physical Therapist          Modalities     Row Name 01/08/19 1500             Moist Heat    MH Applied  Yes  -AJ      Location  right hip  -AJ      Rx Minutes  10 mins  -AJ      MH S/P Rx  Yes  -AJ        User Key  (r) = Recorded By, (t) = Taken By, (c) = Cosigned By    Initials Name Provider Type    Laxmi Hernandez, PT Physical Therapist          Exercises     Row Name 01/08/19 1500             Precautions    Existing Precautions/Restrictions  seizures  (Significant)   -AJ         Subjective Comments    Subjective Comments  Patient reports that she feels okay for a day or so after PT but it doesn't last. She reports it takes her over an hour to get dressed sometimes. She reports she doesn't feel like PT is helping. Patient also reports pain goes from her LB and down to her foot at times.  -AJ         Subjective Pain    Able to rate subjective pain?  yes  -AJ      Pre-Treatment Pain Level  8  -AJ      Post-Treatment Pain Level  6  -AJ         Exercise 1    Exercise Name 1  Pro II LE  -AJ      Time 1  10 minutes  -AJ      Additional Comments  L 3.0  -AJ         Exercise 2    Exercise Name 2  B St. HS S  -AJ      Reps 2  1  -AJ      Time 2  30 seconds  -AJ         Exercise 3    Exercise Name 3  B sitting HS S  -AJ      Reps 3  1  -AJ      Time 3  30 seconds  -AJ         Exercise 4    Exercise Name 4  B seated piriformis S  -AJ      Additional Comments  attempted, not able to perform.  -AJ         Exercise 5    Exercise Name 5  SL- ITB S  -AJ      Additional Comments  attempted, not tolerated.  -AJ         Exercise 6    Exercise Name 6  ROM measurements/Discussion of POC.  -AJ        User Key  (r) = Recorded By, (t) = Taken By, (c) = Cosigned By    Initials  Name Provider Type    Laxmi Hernandez, PT Physical Therapist                         PT OP Goals     Row Name 01/08/19 1500          PT Short Term Goals    STG Date to Achieve  01/01/19  -     STG 1  Pt independent with HEP for LE stretching/strengthening  -     STG 1 Progress  Partially Met  -     STG 2  Improve right hip flexion AROM to 95°.  -     STG 2 Progress  Ongoing;Met;Progressing  -     STG 3  Improve R LE MMT (hip flex/abd, knee ext) to 4+/5   -     STG 3 Progress  Not Met  -     STG 4  Reduce right sided back and R hip pain by 25% with standing and walking  -     STG 4 Progress  Not Met  -        Long Term Goals    LTG Date to Achieve  01/15/19  -     LTG 1  Improve right hip flexion AROM to 100°.  -     LTG 1 Progress  Ongoing  -     LTG 2  Improve R LE MMT (hip flex/abd, knee ext) to 4+/5   -     LTG 2 Progress  Not Met  -     LTG 3  Reduce right sided back and R hip pain by 50% with standing and walking  -     LTG 3 Progress  Not Met  -     LTG 4  Improve LEFS score to 19 or higher  -     LTG 4 Progress  Not Met  -        Time Calculation    PT Goal Re-Cert Due Date  -- N/A  -       User Key  (r) = Recorded By, (t) = Taken By, (c) = Cosigned By    Initials Name Provider Type    Laxmi Hernandez, PT Physical Therapist          Therapy Education  Given: HEP, Symptoms/condition management, Pain management(POC)  Program: Reinforced  How Provided: Verbal, Demonstration  Provided to: Patient  Level of Understanding: Verbalized, Demonstrated    Outcome Measure Options: Lower Extremity Functional Scale (LEFS)  Lower Extremity Functional Index  Any of your usual work, housework or school activities: Quite a bit of difficulty  Your usual hobbies, recreational or sporting activities: Quite a bit of difficulty  Getting into or out of the bath: Quite a bit of difficulty  Walking between rooms: Moderate difficulty  Putting on your shoes or socks: Quite a bit of  difficulty  Squatting: Extreme difficulty or unable to perform activity  Lifting an object, like a bag of groceries from the floor: Quite a bit of difficulty  Performing light activities around your home: Quite a bit of difficulty  Performing heavy activities around your home: Extreme difficulty or unable to perform activity  Getting into or out of a car: Quite a bit of difficulty  Walking 2 blocks: Extreme difficulty or unable to perform activity  Walking a mile: Extreme difficulty or unable to perform activity  Going up or down 10 stairs (about 1 flight of stairs): Extreme difficulty or unable to perform activity  Standing for 1 hour: Quite a bit of difficulty  Sitting for 1 hour: Quite a bit of difficulty  Running on even ground: Extreme difficulty or unable to perform activity  Running on uneven ground: Extreme difficulty or unable to perform activity  Making sharp turns while running fast: Extreme difficulty or unable to perform activity  Hopping: Extreme difficulty or unable to perform activity  Rolling over in bed: Quite a bit of difficulty  Total: 12      Time Calculation:   Start Time: 1504  Stop Time: 1542  Time Calculation (min): 38 min  PT Non-Billable Time (min): 10 min  Total Timed Code Minutes- PT: 28 minute(s)    Therapy Charges for Today     Code Description Service Date Service Provider Modifiers Qty    11948130188 HC PT THER PROC EA 15 MIN 1/8/2019 Laxmi Back, PT GP 2    85107011197 HC PT THER SUPP EA 15 MIN 1/8/2019 Laxmi Back, PT GP 1          PT G-Codes  Outcome Measure Options: Lower Extremity Functional Scale (LEFS)  Total: 12     OP PT Discharge Summary  Date of Discharge: 01/08/19  Reason for Discharge: Lack of progress, Unable to participate(No tolerance to ther ex.)  Outcomes Achieved: Unable to make functional progress toward goals at this time  Discharge Destination: Home with home program  Discharge Instructions/Additional Comments: Recommended follow-up with  referring provider and with LB specialist.      Laxmi Back, PT, DPT, CSCS  1/8/2019

## 2020-01-08 ENCOUNTER — HOSPITAL ENCOUNTER (OUTPATIENT)
Facility: HOSPITAL | Age: 75
Setting detail: HOSPITAL OUTPATIENT SURGERY
Discharge: HOME | End: 2020-01-08
Attending: INTERNAL MEDICINE | Admitting: INTERNAL MEDICINE

## 2020-01-08 ENCOUNTER — ANESTHESIA EVENT (OUTPATIENT)
Dept: GASTROENTEROLOGY | Facility: HOSPITAL | Age: 75
End: 2020-01-08

## 2020-01-08 ENCOUNTER — ANESTHESIA (OUTPATIENT)
Dept: GASTROENTEROLOGY | Facility: HOSPITAL | Age: 75
End: 2020-01-08

## 2020-01-08 VITALS
WEIGHT: 204.15 LBS | BODY MASS INDEX: 27.65 KG/M2 | DIASTOLIC BLOOD PRESSURE: 51 MMHG | HEIGHT: 72 IN | RESPIRATION RATE: 18 BRPM | OXYGEN SATURATION: 95 % | SYSTOLIC BLOOD PRESSURE: 96 MMHG | HEART RATE: 98 BPM | TEMPERATURE: 96.9 F

## 2020-01-08 PROCEDURE — 25010000002 PROPOFOL 10 MG/ML EMULSION: Performed by: NURSE ANESTHETIST, CERTIFIED REGISTERED

## 2020-01-08 RX ORDER — DEXTROSE AND SODIUM CHLORIDE 5; .45 G/100ML; G/100ML
30 INJECTION, SOLUTION INTRAVENOUS CONTINUOUS PRN
Status: DISCONTINUED | OUTPATIENT
Start: 2020-01-08 | End: 2020-01-08 | Stop reason: HOSPADM

## 2020-01-08 RX ORDER — PROPOFOL 10 MG/ML
VIAL (ML) INTRAVENOUS AS NEEDED
Status: DISCONTINUED | OUTPATIENT
Start: 2020-01-08 | End: 2020-01-08 | Stop reason: SURG

## 2020-01-08 RX ORDER — LIDOCAINE HYDROCHLORIDE 20 MG/ML
INJECTION, SOLUTION INTRAVENOUS AS NEEDED
Status: DISCONTINUED | OUTPATIENT
Start: 2020-01-08 | End: 2020-01-08 | Stop reason: SURG

## 2020-01-08 RX ADMIN — PROPOFOL 20 MG: 10 INJECTION, EMULSION INTRAVENOUS at 14:10

## 2020-01-08 RX ADMIN — DEXTROSE AND SODIUM CHLORIDE 30 ML/HR: 5; 450 INJECTION, SOLUTION INTRAVENOUS at 13:17

## 2020-01-08 RX ADMIN — PROPOFOL 30 MG: 10 INJECTION, EMULSION INTRAVENOUS at 14:08

## 2020-01-08 RX ADMIN — PROPOFOL 30 MG: 10 INJECTION, EMULSION INTRAVENOUS at 14:14

## 2020-01-08 RX ADMIN — PROPOFOL 20 MG: 10 INJECTION, EMULSION INTRAVENOUS at 14:25

## 2020-01-08 RX ADMIN — LIDOCAINE HYDROCHLORIDE 50 MG: 20 INJECTION, SOLUTION INTRAVENOUS at 14:04

## 2020-01-08 RX ADMIN — PROPOFOL 30 MG: 10 INJECTION, EMULSION INTRAVENOUS at 14:12

## 2020-01-08 RX ADMIN — PROPOFOL 20 MG: 10 INJECTION, EMULSION INTRAVENOUS at 14:20

## 2020-01-08 RX ADMIN — PROPOFOL 70 MG: 10 INJECTION, EMULSION INTRAVENOUS at 14:04

## 2020-01-08 RX ADMIN — PROPOFOL 20 MG: 10 INJECTION, EMULSION INTRAVENOUS at 14:17

## 2020-01-08 NOTE — ANESTHESIA PREPROCEDURE EVALUATION
Anesthesia Evaluation     Patient summary reviewed   no history of anesthetic complications:  NPO Solid Status: > 8 hours  NPO Liquid Status: > 2 hours           Airway   Mallampati: II  TM distance: >3 FB  Neck ROM: full  Possible difficult intubation  Dental    (+) edentulous    Pulmonary     breath sounds clear to auscultation  (+) sleep apnea, decreased breath sounds,   Cardiovascular - normal exam  Exercise tolerance: poor (<4 METS)    ECG reviewed  Patient on routine beta blocker and Beta blocker given within 24 hours of surgery  Rhythm: regular  Rate: normal    (+) hypertension less than 2 medications,   (-) dysrhythmias    ROS comment: Normal sinus rhythm  Normal ECG  When compared with ECG of 29-JAN-2013 01:55,  No significant change was found  Confirmed by AKRAM    Neuro/Psych  (+) seizures (once in 2010) well controlled, headaches (migraines controlled), psychiatric history Anxiety and Depression,     GI/Hepatic/Renal/Endo    (+)  GERD well controlled,  renal disease CRI,     ROS Comment: Recurrent UTIs  incontinence    Musculoskeletal     (+) back pain, chronic pain,   Abdominal  - normal exam   Substance History - negative use     OB/GYN          Other   arthritis (hips and hands),                        Anesthesia Plan    ASA 3     MAC     intravenous induction     Anesthetic plan, all risks, benefits, and alternatives have been provided, discussed and informed consent has been obtained with: patient and spouse/significant other.    Plan discussed with CRNA.

## 2020-01-08 NOTE — ANESTHESIA POSTPROCEDURE EVALUATION
Patient: Chanell Molinafi    Procedure Summary     Date:  01/08/20 Room / Location:  [facility] ENDOSCOPY 2 / [facility] ENDOSCOPY    Anesthesia Start:  1401 Anesthesia Stop:  1429    Procedures:       ESOPHAGOGASTRODUODENOSCOPY (N/A )      COLONOSCOPY (N/A ) Diagnosis:       Dyskinesia of esophagus      Personal history of colonic polyps      (Dyskinesia of esophagus [K22.4])      (Personal history of colonic polyps [Z86.010])    Surgeon:  Alvin Rievrs DO Provider:  Lemuel Presley CRNA    Anesthesia Type:  MAC ASA Status:  3          Anesthesia Type: MAC    Vitals  No vitals data found for the desired time range.          Post Anesthesia Care and Evaluation    Patient location during evaluation: bedside  Patient participation: complete - patient participated  Level of consciousness: awake  Pain score: 0  Pain management: adequate  Airway patency: patent  Anesthetic complications: No anesthetic complications  PONV Status: none  Cardiovascular status: acceptable  Respiratory status: acceptable  Hydration status: acceptable

## 2020-01-08 NOTE — H&P
Nicole Garcia DO,[facility]  Gastroenterology  Hepatology  Endoscopy  Board Certified in Internal Medicine and gastroenterology  [address]  [place] [address]  T- [phone]   F - [phone]     GASTROENTEROLOGY HISTORY AND PHYSICAL  NOTE   NICOLE GARCIA DO.         SUBJECTIVE:   2020    Name: Chanell Carrera  DOD: 1945        Chief Complaint:       Subjective : Dysphasia with a history of dyskinesia.  Personal history of colon polyps    Patient is 74 y.o. female presents with desire for elective EGD and dilation and colonoscopy.      ROS/HISTORY/ CURRENT MEDICATIONS/OBJECTIVE/VS/PE:   Review of Systems:  All systems unremarkable unless specified below.  Constitutional   HENT  Eyes   Respiratory    Cardiovascular  Gastrointestinal   Endocrine  Genitourinary    Musculoskeletal   Skin  Allergic/Immunologic    Neurological    Hematological  Psychiatric/Behavioral    History:     Past Medical History:   Diagnosis Date   • Anxiety    • Arthritis    • C. difficile diarrhea     approx 5-6 yr ago   • Chronic back pain    • Fibromyalgia    • GERD (gastroesophageal reflux disease)    • Kidney atrophy     r/t pregnency    • Plantar wart    • Seizure (CMS/HCC)     x 1, r/t medication   • Sleep apnea     does not wear c-pap     Past Surgical History:   Procedure Laterality Date   • BACK SURGERY      x 6   •  SECTION     • EXCISION FOOT TUMOR Left 2018    Procedure: FOOT EXCISION AND ABLATION PLANTAR WARTS;  Surgeon: Nicole Back DPM;  Location: [facility];  Service: Podiatry   • HYSTERECTOMY     • NECK SURGERY     • VEIN SURGERY      removed between ovary and kidney     Family History   Problem Relation Age of Onset   • Cancer Mother    • Cancer Father    • Cancer Brother    • Cancer Maternal Grandmother    • Cancer Maternal Grandfather    • Cancer Paternal Grandmother    • Cancer Paternal Grandfather    • Cancer Sister      Social History     Tobacco Use   •  Smoking status: Never Smoker   • Smokeless tobacco: Never Used   Substance Use Topics   • Alcohol use: Yes     Comment: occasional   • Drug use: No     Prior to Admission medications    Medication Sig Start Date End Date Taking? Authorizing Provider   baclofen (LIORESAL) 10 MG tablet Take 10 mg by mouth 2 (Two) Times a Day.   Yes Ariana Hendricks MD   carvedilol (COREG) 12.5 MG tablet Take 12.5 mg by mouth 2 (Two) Times a Day With Meals.   Yes Ariana Hendrciks MD   celecoxib (CeleBREX) 200 MG capsule Take 200 mg by mouth Daily.   Yes Ariana Hendricks MD   citalopram (CeleXA) 20 MG tablet Take 1 tablet by mouth daily. 8/9/16  Yes Jarod Milligan MD   Mirabegron ER (MYRBETRIQ) 50 MG tablet sustained-release 24 hour 24 hr tablet Take 50 mg by mouth Daily.   Yes Ariana Hendricks MD   oxyCODONE-acetaminophen (PERCOCET)  MG per tablet Take 1 tablet by mouth Every 6 (Six) Hours As Needed for Moderate Pain .   Yes Ariana Hendricks MD   pantoprazole (PROTONIX) 40 MG EC tablet Take 40 mg by mouth Daily.   Yes Ariana Hendricks MD   traZODone (DESYREL) 100 MG tablet Take 100 mg by mouth Every Night.   Yes Ariana Hendricks MD   zolpidem (AMBIEN) 10 MG tablet Take 10 mg by mouth At Night As Needed for Sleep.   Yes Ariana Hendricks MD   diphenhydrAMINE (BENADRYL) 25 mg capsule Take 25 mg by mouth Every 6 (Six) Hours As Needed for Itching.    ProviderAriana MD     Allergies:  Patient has no known allergies.    I have reviewed the patients medical history, surgical history and family history in the available medical record system.     Current Medications:     Current Facility-Administered Medications   Medication Dose Route Frequency Provider Last Rate Last Dose   • dextrose 5 % and sodium chloride 0.45 % infusion  30 mL/hr Intravenous Continuous PRN Alvin Rivers DO 30 mL/hr at 01/08/20 1317 30 mL/hr at 01/08/20 1317       Objective     Physical Exam:   Temp:  [97.4 °F  (36.3 °C)] 97.4 °F (36.3 °C)  Heart Rate:  [108] 108  Resp:  [14] 14  BP: (137)/(76) 137/76    Physical Exam:  General Appearance:    Alert, cooperative, in no acute distress   Head:    Normocephalic, without obvious abnormality, atraumatic   Eyes:            Lids and lashes normal, conjunctivae and sclerae normal, no   icterus, no pallor, corneas clear, PERRLA   Ears:    Ears appear intact with no abnormalities noted   Throat:   No oral lesions, no thrush, oral mucosa moist   Neck:   No adenopathy, supple, trachea midline, no thyromegaly, no     carotid bruit, no JVD   Back:     No kyphosis present, no scoliosis present, no skin lesions,       erythema or scars, no tenderness to percussion or                   palpation,   range of motion normal   Lungs:     Clear to auscultation,respirations regular, even and                   unlabored    Heart:    Regular rhythm and normal rate, normal S1 and S2, no            murmur, no gallop, no rub, no click   Breast Exam:    Deferred   Abdomen:     Normal bowel sounds, no masses, no organomegaly, soft        non-tender, non-distended, no guarding, no rebound                 tenderness   Genitalia:    Deferred   Extremities:   Moves all extremities well, no edema, no cyanosis, no              redness   Pulses:   Pulses palpable and equal bilaterally   Skin:   No bleeding, bruising or rash   Lymph nodes:   No palpable adenopathy   Neurologic:   Cranial nerves 2 - 12 grossly intact, sensation intact, DTR        present and equal bilaterally      Results Review:     Lab Results   Component Value Date    WBC 4.3 08/26/2014    HGB 13.5 08/26/2014    HCT 39.5 08/26/2014     08/26/2014             No results found for: LIPASE  No results found for: INR  No results found for: CULTURE    Radiology Review:  Imaging Results (Last 72 Hours)     ** No results found for the last 72 hours. **           I reviewed the patient's new clinical results.  I reviewed the patient's new  imaging results and agree with the interpretation.     ASSESSMENT/PLAN:   ASSESSMENT:  1.  Dysphasia  2.  Dyskinesia of esophagus  3.  Personal history of colon polyps    PLAN:  1.  Esophagogastroduodenoscopy with dilation of the esophagus  2.  Colonoscopy    Risk and benefits associated with procedure are reviewed with the patient.  Patient wished to proceed     Alvin Rivers DO  01/08/20  1:43 PM

## 2021-02-16 ENCOUNTER — APPOINTMENT (OUTPATIENT)
Dept: VACCINE CLINIC | Facility: HOSPITAL | Age: 76
End: 2021-02-16

## 2021-02-17 ENCOUNTER — IMMUNIZATION (OUTPATIENT)
Dept: VACCINE CLINIC | Facility: HOSPITAL | Age: 76
End: 2021-02-17

## 2021-02-17 PROCEDURE — 91300 HC SARSCOV02 VAC 30MCG/0.3ML IM: CPT | Performed by: THORACIC SURGERY (CARDIOTHORACIC VASCULAR SURGERY)

## 2021-02-17 PROCEDURE — 0001A: CPT | Performed by: THORACIC SURGERY (CARDIOTHORACIC VASCULAR SURGERY)

## 2021-03-08 ENCOUNTER — APPOINTMENT (OUTPATIENT)
Dept: VACCINE CLINIC | Facility: HOSPITAL | Age: 76
End: 2021-03-08

## 2021-03-10 ENCOUNTER — IMMUNIZATION (OUTPATIENT)
Dept: VACCINE CLINIC | Facility: HOSPITAL | Age: 76
End: 2021-03-10

## 2021-03-10 PROCEDURE — 91300 HC SARSCOV02 VAC 30MCG/0.3ML IM: CPT | Performed by: NURSE PRACTITIONER

## 2021-03-10 PROCEDURE — 0002A: CPT | Performed by: NURSE PRACTITIONER

## (undated) DEVICE — GOWN,AURORA,NOREINF,RAGLAN,XL,STERILE: Brand: MEDLINE

## (undated) DEVICE — GLV SURG SENSICARE GREEN W/ALOE PF LF 7 STRL

## (undated) DEVICE — CANN SMPL SOFTECH BIFLO ETCO2 A/M 7FT

## (undated) DEVICE — PK POD 60

## (undated) DEVICE — STERILE POLYISOPRENE POWDER-FREE SURGICAL GLOVES WITH EMOLLIENT COATING: Brand: PROTEXIS

## (undated) DEVICE — SPNG GZ WOVN 4X4IN 12PLY 10/BX STRL

## (undated) DEVICE — NDL HYPO ECLPS SFTY 25G 1 1/2IN

## (undated) DEVICE — APPL CHLORAPREP W/TINT 26ML ORNG

## (undated) DEVICE — SOL IRR NACL 0.9PCT BT 1000ML

## (undated) DEVICE — SYR LL TP 10ML STRL

## (undated) DEVICE — DRSNG WND GZ CURAD OIL EMULSION 3X8IN STRL PK/3

## (undated) DEVICE — SHOE P/OP O/T SQ MD

## (undated) DEVICE — CONTAINER,SPECIMEN,OR STERILE,4OZ: Brand: MEDLINE

## (undated) DEVICE — UNDRPD BREATH 23X36 BG/10

## (undated) DEVICE — STERILE POLYISOPRENE POWDER-FREE SURGICAL GLOVES: Brand: PROTEXIS

## (undated) DEVICE — BITEBLOCK ENDO W/STRAP 60F A/ LF DISP

## (undated) DEVICE — GLV SURG SENSICARE GREEN W/ALOE PF LF 6.5 STRL